# Patient Record
Sex: MALE | Race: BLACK OR AFRICAN AMERICAN | Employment: FULL TIME | URBAN - NONMETROPOLITAN AREA
[De-identification: names, ages, dates, MRNs, and addresses within clinical notes are randomized per-mention and may not be internally consistent; named-entity substitution may affect disease eponyms.]

---

## 2017-01-03 ENCOUNTER — HOSPITAL ENCOUNTER (EMERGENCY)
Age: 27
Discharge: HOME | End: 2017-01-03
Payer: MEDICAID

## 2017-01-03 VITALS
RESPIRATION RATE: 16 BRPM | HEART RATE: 63 BPM | DIASTOLIC BLOOD PRESSURE: 65 MMHG | OXYGEN SATURATION: 97 % | SYSTOLIC BLOOD PRESSURE: 118 MMHG | TEMPERATURE: 99 F

## 2017-01-03 DIAGNOSIS — J02.9 VIRAL PHARYNGITIS: Primary | ICD-10-CM

## 2017-01-03 LAB — S PYO AG THROAT QL: NEGATIVE

## 2017-01-03 PROCEDURE — 87880 STREP A ASSAY W/OPTIC: CPT

## 2017-01-03 PROCEDURE — 99282 EMERGENCY DEPT VISIT SF MDM: CPT | Performed by: NURSE PRACTITIONER

## 2017-01-03 PROCEDURE — 87081 CULTURE SCREEN ONLY: CPT

## 2017-01-03 PROCEDURE — 99282 EMERGENCY DEPT VISIT SF MDM: CPT

## 2017-01-03 PROCEDURE — 6370000000 HC RX 637 (ALT 250 FOR IP): Performed by: NURSE PRACTITIONER

## 2017-01-03 RX ORDER — IBUPROFEN 600 MG/1
600 TABLET ORAL EVERY 6 HOURS PRN
Qty: 30 TABLET | Refills: 0 | Status: SHIPPED | OUTPATIENT
Start: 2017-01-03 | End: 2017-02-11

## 2017-01-03 RX ORDER — PREDNISONE 20 MG/1
20 TABLET ORAL DAILY
Qty: 5 TABLET | Refills: 0 | Status: SHIPPED | OUTPATIENT
Start: 2017-01-03 | End: 2017-01-08

## 2017-01-03 RX ORDER — HYDROCODONE BITARTRATE AND ACETAMINOPHEN 7.5; 325 MG/1; MG/1
1 TABLET ORAL EVERY 6 HOURS PRN
Status: DISCONTINUED | OUTPATIENT
Start: 2017-01-03 | End: 2017-01-03 | Stop reason: HOSPADM

## 2017-01-03 RX ORDER — HYDROCODONE BITARTRATE AND ACETAMINOPHEN 5; 325 MG/1; MG/1
1 TABLET ORAL EVERY 8 HOURS PRN
Qty: 10 TABLET | Refills: 0 | Status: SHIPPED | OUTPATIENT
Start: 2017-01-03 | End: 2017-02-11

## 2017-01-03 RX ORDER — PREDNISONE 1 MG/1
20 TABLET ORAL DAILY
Status: DISCONTINUED | OUTPATIENT
Start: 2017-01-03 | End: 2017-01-03 | Stop reason: HOSPADM

## 2017-01-03 RX ADMIN — PREDNISONE 20 MG: 5 TABLET ORAL at 07:53

## 2017-01-03 RX ADMIN — HYDROCODONE BITARTRATE AND ACETAMINOPHEN 1 TABLET: 7.5; 325 TABLET ORAL at 07:58

## 2017-01-03 ASSESSMENT — PAIN SCALES - GENERAL: PAINLEVEL_OUTOF10: 10

## 2017-01-03 ASSESSMENT — ENCOUNTER SYMPTOMS
RESPIRATORY NEGATIVE: 1
SORE THROAT: 1

## 2017-01-05 LAB — S PYO THROAT QL CULT: NORMAL

## 2017-02-11 ENCOUNTER — HOSPITAL ENCOUNTER (EMERGENCY)
Age: 27
Discharge: HOME | End: 2017-02-11
Attending: EMERGENCY MEDICINE

## 2017-02-11 ENCOUNTER — APPOINTMENT (OUTPATIENT)
Dept: GENERAL RADIOLOGY | Age: 27
End: 2017-02-11

## 2017-02-11 VITALS
HEART RATE: 85 BPM | BODY MASS INDEX: 20.46 KG/M2 | SYSTOLIC BLOOD PRESSURE: 121 MMHG | HEIGHT: 68 IN | DIASTOLIC BLOOD PRESSURE: 60 MMHG | WEIGHT: 135 LBS | RESPIRATION RATE: 18 BRPM | TEMPERATURE: 98.7 F | OXYGEN SATURATION: 97 %

## 2017-02-11 DIAGNOSIS — J01.00 ACUTE NON-RECURRENT MAXILLARY SINUSITIS: Primary | ICD-10-CM

## 2017-02-11 LAB
RAPID INFLUENZA  B AGN: NEGATIVE
RAPID INFLUENZA A AGN: NEGATIVE
S PYO AG THROAT QL: NEGATIVE

## 2017-02-11 PROCEDURE — 71020 XR CHEST STANDARD TWO VW: CPT

## 2017-02-11 PROCEDURE — 99282 EMERGENCY DEPT VISIT SF MDM: CPT | Performed by: EMERGENCY MEDICINE

## 2017-02-11 PROCEDURE — 99283 EMERGENCY DEPT VISIT LOW MDM: CPT

## 2017-02-11 PROCEDURE — 87880 STREP A ASSAY W/OPTIC: CPT

## 2017-02-11 PROCEDURE — 6370000000 HC RX 637 (ALT 250 FOR IP): Performed by: EMERGENCY MEDICINE

## 2017-02-11 PROCEDURE — 87081 CULTURE SCREEN ONLY: CPT

## 2017-02-11 PROCEDURE — 87804 INFLUENZA ASSAY W/OPTIC: CPT

## 2017-02-11 RX ORDER — ACETAMINOPHEN 325 MG/1
650 TABLET ORAL ONCE
Status: COMPLETED | OUTPATIENT
Start: 2017-02-11 | End: 2017-02-11

## 2017-02-11 RX ORDER — MOMETASONE FUROATE 50 UG/1
2 SPRAY, METERED NASAL DAILY
Qty: 1 INHALER | Refills: 0 | Status: SHIPPED | OUTPATIENT
Start: 2017-02-11 | End: 2020-06-27

## 2017-02-11 RX ORDER — AMOXICILLIN AND CLAVULANATE POTASSIUM 875; 125 MG/1; MG/1
1 TABLET, FILM COATED ORAL 2 TIMES DAILY
Qty: 20 TABLET | Refills: 0 | Status: SHIPPED | OUTPATIENT
Start: 2017-02-11 | End: 2017-02-21

## 2017-02-11 RX ADMIN — ACETAMINOPHEN 650 MG: 325 TABLET, FILM COATED ORAL at 07:50

## 2017-02-11 ASSESSMENT — ENCOUNTER SYMPTOMS
SHORTNESS OF BREATH: 0
COUGH: 1
DIARRHEA: 0
NAUSEA: 0
SORE THROAT: 1
SINUS PRESSURE: 1
RHINORRHEA: 1
VOMITING: 0
BACK PAIN: 0
ABDOMINAL PAIN: 0

## 2017-02-11 ASSESSMENT — PAIN SCALES - GENERAL: PAINLEVEL_OUTOF10: 10

## 2017-02-13 LAB — S PYO THROAT QL CULT: NORMAL

## 2018-01-16 ENCOUNTER — OFFICE VISIT CONVERTED (OUTPATIENT)
Dept: ORTHOPEDIC SURGERY | Facility: CLINIC | Age: 28
End: 2018-01-16
Attending: ORTHOPAEDIC SURGERY

## 2020-06-27 ENCOUNTER — HOSPITAL ENCOUNTER (EMERGENCY)
Age: 30
Discharge: HOME | End: 2020-06-27
Attending: EMERGENCY MEDICINE

## 2020-06-27 VITALS
DIASTOLIC BLOOD PRESSURE: 78 MMHG | RESPIRATION RATE: 18 BRPM | BODY MASS INDEX: 22.6 KG/M2 | TEMPERATURE: 99 F | SYSTOLIC BLOOD PRESSURE: 115 MMHG | WEIGHT: 144 LBS | HEART RATE: 73 BPM | OXYGEN SATURATION: 95 % | HEIGHT: 67 IN

## 2020-06-27 PROCEDURE — 99283 EMERGENCY DEPT VISIT LOW MDM: CPT

## 2020-06-27 ASSESSMENT — ENCOUNTER SYMPTOMS
ABDOMINAL PAIN: 0
EYE PAIN: 0
VOMITING: 0
DIARRHEA: 0
SHORTNESS OF BREATH: 0

## 2021-05-16 VITALS — HEART RATE: 91 BPM | OXYGEN SATURATION: 99 % | BODY MASS INDEX: 21.97 KG/M2 | WEIGHT: 140 LBS | HEIGHT: 67 IN

## 2022-09-13 ENCOUNTER — TELEPHONE (OUTPATIENT)
Dept: GASTROENTEROLOGY | Age: 32
End: 2022-09-13

## 2022-09-13 NOTE — TELEPHONE ENCOUNTER
Jaylen Cage requests a call back please, he advised that paperwork is to be faxed to the office and he is wanting to schedule an appt as soon as possible for intestinal issues. I could not see a referral in the chart at the time the patient called. Thank you.

## 2022-09-14 NOTE — TELEPHONE ENCOUNTER
I got the referral over the fax machine just now. I will process it and call the patient to schedule.

## 2022-09-15 ENCOUNTER — OFFICE VISIT (OUTPATIENT)
Dept: GASTROENTEROLOGY | Age: 32
End: 2022-09-15
Payer: COMMERCIAL

## 2022-09-15 VITALS
WEIGHT: 125 LBS | OXYGEN SATURATION: 98 % | SYSTOLIC BLOOD PRESSURE: 120 MMHG | HEIGHT: 67 IN | HEART RATE: 92 BPM | BODY MASS INDEX: 19.62 KG/M2 | DIASTOLIC BLOOD PRESSURE: 70 MMHG

## 2022-09-15 DIAGNOSIS — G89.29 CHRONIC GENERALIZED ABDOMINAL PAIN: ICD-10-CM

## 2022-09-15 DIAGNOSIS — K52.9 CHRONIC DIARRHEA: Primary | ICD-10-CM

## 2022-09-15 DIAGNOSIS — R10.84 CHRONIC GENERALIZED ABDOMINAL PAIN: ICD-10-CM

## 2022-09-15 DIAGNOSIS — R93.3 ABNORMAL CT SCAN, COLON: ICD-10-CM

## 2022-09-15 PROCEDURE — 99203 OFFICE O/P NEW LOW 30 MIN: CPT | Performed by: NURSE PRACTITIONER

## 2022-09-15 PROCEDURE — G8427 DOCREV CUR MEDS BY ELIG CLIN: HCPCS | Performed by: NURSE PRACTITIONER

## 2022-09-15 PROCEDURE — G8420 CALC BMI NORM PARAMETERS: HCPCS | Performed by: NURSE PRACTITIONER

## 2022-09-15 PROCEDURE — 4004F PT TOBACCO SCREEN RCVD TLK: CPT | Performed by: NURSE PRACTITIONER

## 2022-09-15 RX ORDER — AMOXICILLIN AND CLAVULANATE POTASSIUM 875; 125 MG/1; MG/1
TABLET, FILM COATED ORAL
COMMUNITY
Start: 2022-09-12

## 2022-09-15 ASSESSMENT — ENCOUNTER SYMPTOMS
BLOOD IN STOOL: 0
ABDOMINAL DISTENTION: 0
ABDOMINAL PAIN: 1
VOICE CHANGE: 0
CONSTIPATION: 0
RECTAL PAIN: 0
TROUBLE SWALLOWING: 0
NAUSEA: 0
COUGH: 0
DIARRHEA: 1
SHORTNESS OF BREATH: 0
BACK PAIN: 0
ANAL BLEEDING: 0
SORE THROAT: 0
VOMITING: 0

## 2022-09-15 NOTE — PROGRESS NOTES
Subjective:      Kari Young is a34 y.o. male  Chief Complaint   Patient presents with    New Patient    Diarrhea       HPI  PCP: Xiomy Ivan MD  Referring Provider: Xiomy Ivan MD  New pt referral  From PCP  Pt reports the following GI complaints    Reports abd pain  From upper abdomen to pelvic region  Chronic for many years  Used to only occur after eating and when needing to have a BM  Over the past month the pain has worsened in intensity and also has occurred more frequently  Pain is a severe sharp stabbing cramping pain    Reports diarrhea  Chronic since childhood  Has 10+ diarrhea stools a day  Pt is color blind so not able to tell if there is blood in stool    Had CT abd/pelvis for evaluation of this 9/12/2022 at outside facility:  Mild wall thickening of ascending and proximal transverse colon consistent with acute colitis      Family HX:    Pt denies family hx of colon polyps, colon CA, inflammatory bowel dx, gastric CA and esophageal CA. History reviewed. No pertinent past medical history. Past Surgical History:   Procedure Laterality Date    TEAR DUCT SURGERY      TEAR DUCT SURGERY         Social History     Socioeconomic History    Marital status: Single     Spouse name: None    Number of children: None    Years of education: None    Highest education level: None   Tobacco Use    Smoking status: Every Day     Types: Cigarettes, Cigars    Smokeless tobacco: Never    Tobacco comments:     BLACK N MILDS   Vaping Use    Vaping Use: Never used   Substance and Sexual Activity    Alcohol use: Yes     Comment: occ    Drug use: Not Currently     Types: Marijuana Arianna Hurt)     Comment: STOPPED IN JULY 2022    Sexual activity: Not Currently       No Known Allergies    Current Outpatient Medications   Medication Sig Dispense Refill    amoxicillin-clavulanate (AUGMENTIN) 875-125 MG per tablet TAKE 1 TABLET BY MOUTH TWICE DAILY       No current facility-administered medications for this visit. Review of Systems   Constitutional:  Negative for appetite change, fatigue, fever and unexpected weight change. HENT:  Negative for sore throat, trouble swallowing and voice change. Respiratory:  Negative for cough and shortness of breath. Cardiovascular:  Negative for chest pain, palpitations and leg swelling. Gastrointestinal:  Positive for abdominal pain and diarrhea. Negative for abdominal distention, anal bleeding, blood in stool, constipation, nausea, rectal pain and vomiting. Genitourinary:  Negative for hematuria. Musculoskeletal:  Negative for arthralgias, back pain and neck pain. Neurological:  Negative for dizziness, weakness, light-headedness and headaches. Psychiatric/Behavioral:  Negative for dysphoric mood and sleep disturbance. The patient is not nervous/anxious. All other systems reviewed and are negative. Objective:     Physical Exam  Vitals and nursing note reviewed. Constitutional:       Appearance: He is well-developed. Comments: /70 (Site: Left Upper Arm)   Pulse 92   Ht 5' 7\" (1.702 m)   Wt 125 lb (56.7 kg)   SpO2 98%   BMI 19.58 kg/m²    Eyes:      General: No scleral icterus. Conjunctiva/sclera: Conjunctivae normal.      Pupils: Pupils are equal, round, and reactive to light. Neck:      Thyroid: No thyromegaly. Cardiovascular:      Rate and Rhythm: Normal rate and regular rhythm. Heart sounds: Normal heart sounds. No murmur heard. No friction rub. No gallop. Pulmonary:      Effort: Pulmonary effort is normal. No respiratory distress. Breath sounds: Normal breath sounds. Abdominal:      General: Bowel sounds are normal. There is no distension. Palpations: Abdomen is soft. Tenderness: There is abdominal tenderness (generalized abd with palpation). There is no rebound. Musculoskeletal:         General: No deformity. Normal range of motion. Cervical back: Normal range of motion and neck supple.    Skin:

## 2022-09-15 NOTE — PATIENT INSTRUCTIONS
You are going to have an Endoscopy and here are some basic instructions:    Nothing to eat or drink after midnight EXCEPT:  PLEASE TAKE MEDICATION(S) FOR HIGH BLOOD PRESSURE, SEIZURES, HEART, AND THYROID WITH A SIP OF WATER AT LEAST 2 HOURS PRIOR TO ARRIVAL TIME.   YOU MAY ALSO TAKE ANY INHALERS YOU ARE PRESCRIBED. You will not be able to drive for 24 hours after the procedure due to sedation. Bring a  with you the day of the procedure. No aspirin, ibuprofen, naproxen, fish oil or vitamin E for 5 days before procedure. Continue current medications. If you are on blood thinners, clearance from the prescribing physician will be obtained before your procedure is scheduled. Increased [email or web address] may be associated with discontinuation of your blood thinner and include, but not limited to, stroke, TIA, or cardiac event. If biopsies are taken during the procedure they will be sent to a pathologist for analysis. You will be notified by mail of the pathology results in 2-3 weeks. Your physician may also schedule a follow up appointment with the nurse practitioner to discuss pathology, symptoms or to check if you have had any problems related to your procedure. If you prefer not to return to the office after your procedure please discuss this with your physician on the day of your procedure. You are going to have a colonoscopy and here are some instructions: You will be given specific directions regarding restrictions to diet and bowel prep instructions including laxatives. Please read these instructions one week prior to your scheduled procedure to ensure that you are prepared. Follow prep instructions provided for bowel prep. Take all of the bowel prep as directed. If you are having problems with nausea, stop your prep for 30-45 min to allow the nausea to subside before resuming your prep.      Nothing to eat or drink after midnight the day of the procedure EXCEPT:  PLEASE TAKE MEDICATION(S) FOR HIGH BLOOD PRESSURE, THYROID, SEIZURES, AND HEART THE MORNING OF THE PROCEDURE WITH A SIP OF WATER  AT LEAST 2 HOURS PRIOR TO ARRIVAL TIME.   YOU MAY ALSO TAKE ANY INHALERS YOU ARE PRESCRIBED. You will not be able to drive for 24 hours after the procedure due to sedation. Bring a  with you the day of the procedure. No aspirin, ibuprofen, naproxen, fish oil or vitamin E for 5 days before procedure. If you are on blood thinners, clearance from the prescribing physician will be obtained before your procedure is scheduled. Increased [email or web address] may be associated with discontinuation of your blood thinner and include, but not limited to, stroke, TIA, or cardiac event. If polyps are removed during the procedure they will be sent to a pathologist for analysis. You will be notified by mail of the pathology results in 2-3 weeks. Your physician may also schedule a follow up appointment with the nurse practitioner to discuss pathology, symptoms or to check if you have had any problems related to your procedure. If you prefer not to return to the office after your procedure please discuss this with your physician on the day of your colonoscopy. Final recommendations are based on the pathologist report. Your diet before a colonoscopy bowel preparation is very important to ensure a successful colon exam. It is recommended to consider certain changes to your diet three to four days prior to the procedure. Remember that your bowels need to be empty for the exam.    What foods are good to eat? Cut down on heavy solid foods three to four days before the procedure and start introducing lighter meals to your diet. The following food suggestions are a good part of your diet before a colonoscopy bowel preparation.   Light meat that is easily digestible such as chicken (without the skin)   Potatoes without skin   Cheese   Eggs   A light meal of steamed white fish   Light clear

## 2022-09-16 ENCOUNTER — ANESTHESIA EVENT (OUTPATIENT)
Dept: OPERATING ROOM | Age: 32
End: 2022-09-16

## 2022-09-19 NOTE — CARE COORDINATION
Phoned pt to confirm [address] for 9/20/2022, pt states eh can not be here any sooner, that he is coming from [place].

## 2022-09-20 ENCOUNTER — ANESTHESIA (OUTPATIENT)
Dept: OPERATING ROOM | Age: 32
End: 2022-09-20

## 2022-09-20 ENCOUNTER — APPOINTMENT (OUTPATIENT)
Dept: OPERATING ROOM | Age: 32
End: 2022-09-20

## 2022-09-20 ENCOUNTER — HOSPITAL ENCOUNTER (OUTPATIENT)
Age: 32
Setting detail: OUTPATIENT SURGERY
Discharge: HOME | End: 2022-09-20
Attending: INTERNAL MEDICINE | Admitting: INTERNAL MEDICINE
Payer: COMMERCIAL

## 2022-09-20 ENCOUNTER — TELEPHONE (OUTPATIENT)
Dept: GASTROENTEROLOGY | Age: 32
End: 2022-09-20

## 2022-09-20 ENCOUNTER — HOSPITAL ENCOUNTER (OUTPATIENT)
Age: 32
Setting detail: SPECIMEN
Discharge: HOME | End: 2022-09-20
Payer: COMMERCIAL

## 2022-09-20 VITALS
DIASTOLIC BLOOD PRESSURE: 65 MMHG | HEIGHT: 67 IN | WEIGHT: 125 LBS | SYSTOLIC BLOOD PRESSURE: 103 MMHG | BODY MASS INDEX: 19.62 KG/M2 | OXYGEN SATURATION: 100 % | HEART RATE: 56 BPM | TEMPERATURE: 97.6 F | RESPIRATION RATE: 18 BRPM

## 2022-09-20 DIAGNOSIS — K52.9 CHRONIC DIARRHEA: ICD-10-CM

## 2022-09-20 DIAGNOSIS — A04.72 CLOSTRIDIUM DIFFICILE DIARRHEA: Primary | ICD-10-CM

## 2022-09-20 LAB
ADENOVIRUS F 40 41 PCR: NOT DETECTED
ASTROVIRUS PCR: NOT DETECTED
CAMPYLOBACTER PCR: NOT DETECTED
CLOSTRIDIUM DIFFICILE, PCR: DETECTED
CRYPTOSPORIDIUM PCR: NOT DETECTED
CYCLOSPORA CAYETANENSIS PCR: NOT DETECTED
E COLI ENTEROAGGREGATIVE PCR: NOT DETECTED
E COLI ENTEROPATHOGENIC PCR: NOT DETECTED
E COLI ENTEROTOXIGENIC PCR: NOT DETECTED
E COLI SHIGELLA/ENTEROINVASIVE PCR: DETECTED
ENTAMOEBA HISTOLYTICA PCR: NOT DETECTED
GIARDIA LAMBLIA PCR: NOT DETECTED
NOROVIRUS GI GII PCR: NOT DETECTED
PLESIOMONAS SHIGELLOIDES PCR: NOT DETECTED
ROTAVIRUS A PCR: NOT DETECTED
SALMONELLA PCR: NOT DETECTED
SAPOVIRUS PCR: NOT DETECTED
SHIGA-LIKE TOXIN-PRODUCING E. COLI (STEC) STX1/STX2: NOT DETECTED
VIBRIO CHOLERAE PCR: NOT DETECTED
VIBRIO PCR: NOT DETECTED
YERSINIA ENTEROCOLITICA PCR: NOT DETECTED

## 2022-09-20 PROCEDURE — 43239 EGD BIOPSY SINGLE/MULTIPLE: CPT | Performed by: INTERNAL MEDICINE

## 2022-09-20 PROCEDURE — 43239 EGD BIOPSY SINGLE/MULTIPLE: CPT

## 2022-09-20 PROCEDURE — 45380 COLONOSCOPY AND BIOPSY: CPT

## 2022-09-20 PROCEDURE — 88342 IMHCHEM/IMCYTCHM 1ST ANTB: CPT

## 2022-09-20 PROCEDURE — 88305 TISSUE EXAM BY PATHOLOGIST: CPT

## 2022-09-20 PROCEDURE — 45380 COLONOSCOPY AND BIOPSY: CPT | Performed by: INTERNAL MEDICINE

## 2022-09-20 RX ORDER — ONDANSETRON 2 MG/ML
4 INJECTION INTRAMUSCULAR; INTRAVENOUS
Status: CANCELLED | OUTPATIENT
Start: 2022-09-20 | End: 2022-09-20

## 2022-09-20 RX ORDER — PROPOFOL 10 MG/ML
INJECTION, EMULSION INTRAVENOUS PRN
Status: DISCONTINUED | OUTPATIENT
Start: 2022-09-20 | End: 2022-09-20 | Stop reason: SDUPTHER

## 2022-09-20 RX ORDER — SODIUM CHLORIDE 0.9 % (FLUSH) 0.9 %
5-40 SYRINGE (ML) INJECTION PRN
Status: CANCELLED | OUTPATIENT
Start: 2022-09-20

## 2022-09-20 RX ORDER — VANCOMYCIN HYDROCHLORIDE 125 MG/1
125 CAPSULE ORAL 4 TIMES DAILY
Qty: 56 CAPSULE | Refills: 0 | Status: SHIPPED | OUTPATIENT
Start: 2022-09-20 | End: 2022-10-04

## 2022-09-20 RX ORDER — SODIUM CHLORIDE 9 MG/ML
INJECTION, SOLUTION INTRAVENOUS PRN
Status: CANCELLED | OUTPATIENT
Start: 2022-09-20

## 2022-09-20 RX ORDER — SODIUM CHLORIDE 0.9 % (FLUSH) 0.9 %
5-40 SYRINGE (ML) INJECTION EVERY 12 HOURS SCHEDULED
Status: CANCELLED | OUTPATIENT
Start: 2022-09-20

## 2022-09-20 RX ORDER — SODIUM CHLORIDE 9 MG/ML
INJECTION, SOLUTION INTRAVENOUS CONTINUOUS
Status: DISCONTINUED | OUTPATIENT
Start: 2022-09-20 | End: 2022-09-20 | Stop reason: HOSPADM

## 2022-09-20 RX ORDER — DIPHENHYDRAMINE HYDROCHLORIDE 50 MG/ML
12.5 INJECTION INTRAMUSCULAR; INTRAVENOUS
Status: CANCELLED | OUTPATIENT
Start: 2022-09-20 | End: 2022-09-20

## 2022-09-20 RX ORDER — LIDOCAINE HYDROCHLORIDE 10 MG/ML
INJECTION, SOLUTION EPIDURAL; INFILTRATION; INTRACAUDAL; PERINEURAL PRN
Status: DISCONTINUED | OUTPATIENT
Start: 2022-09-20 | End: 2022-09-20 | Stop reason: SDUPTHER

## 2022-09-20 RX ADMIN — SODIUM CHLORIDE: 9 INJECTION, SOLUTION INTRAVENOUS at 13:02

## 2022-09-20 RX ADMIN — PROPOFOL 480 MG: 10 INJECTION, EMULSION INTRAVENOUS at 14:26

## 2022-09-20 RX ADMIN — LIDOCAINE HYDROCHLORIDE 30 MG: 10 INJECTION, SOLUTION EPIDURAL; INFILTRATION; INTRACAUDAL; PERINEURAL at 14:26

## 2022-09-20 ASSESSMENT — LIFESTYLE VARIABLES: SMOKING_STATUS: 1

## 2022-09-20 ASSESSMENT — PAIN - FUNCTIONAL ASSESSMENT: PAIN_FUNCTIONAL_ASSESSMENT: 0-10

## 2022-09-20 NOTE — DISCHARGE INSTRUCTIONS
1. Await path results, the patient will be contacted in 7-10 days with biopsy results. 2.  Continue PPI and anti-GERD measures  3. Avoid NSAIDs      4. Repeat colonoscopy: pending pathology -if biopsies do not reveal any microscopic colitis, at age 38 to begin colorectal cancer screening; sooner if his personal or family history as pertaining to colorectal cancer risk changes requiring an earlier exam or if the patient were to develop lower GI symptoms such as bleeding, abdominal pain, change in bowel habits or stool caliber or if the patient has anemia or unexplained weight loss in the future. - Resume previous meds and diet  - GI clinic f/u 4-6 weeks with Ms. Brownlee or Ms. Guillen   - Keep scheduled f/u appts with other MDs     - NO ASA/NSAIDs x 2 weeks    NSAIDS Non-steroidal Anti-Inflammatory  You have been directed by your physician to avoid any NSAID's; the following medications are a list of those to avoid. If you think that you are taking any NSAID's notify your physician. Over The Counter  Advil                      Motrin  Nuprin                   Ibuprofen  Midol                     Aleve  Naproxen              Orudis  Aspirin                   [place]      Prescriptions and Generics    Cataflam              Relafen  Voltaren               Clinoril  Indocin                 Naproxen  Arthrotec              Lodine  Daypro                 Nalfon  Toradol                Ansaid  Feldene               Meclofenamate  Fenoprofen          Ponstel  Mobic                   Celebrex  Vioxx     POST-OP ORDERS: ENDOSCOPY & COLONOSCOPY:    1. Rest today. 2. DO NOT eat or drink until wide awake; eat your usual diet today in moderate amount only. 3. DO NOT drive today. 4. Call physician if you have severe pain, vomiting, fever, rectal bleeding or black bowel movements. 5.  If a biopsy was taken or a polyp removed, you should expect to hear results in about 7-10 days.   If you have heard nothing from your physician by then, call the office for results. 6.  Discharge home when patient awake, vitals signs stable and tolerating liquids.

## 2022-09-20 NOTE — OP NOTE
Endoscopic Procedure Note    Patient: Wenceslao Jack : 1990  Med Rec#: [id number] Acc#: [id number]     Primary Care Provider Pcp No    Endoscopist: Stephen Jean MD, MD    Date of Procedure:  2022    Procedure:   1. EGD with cold biopsies    Indications: For both EGD and colonoscopy exams today:  1. Chronic diarrhea                  2. Chronic generalized abdominal pain             3. Abnormal CT scan, colon         Anesthesia:  Sedation was administered by anesthesia who monitored the patient during the procedure. Estimated Blood Loss: minimal    Procedure:   After reviewing the patient's chart and obtaining informed consent, the patient was placed in the left lateral decubitus position. A forward-viewing Olympus endoscope was lubricated and inserted through the mouth into the oropharynx. Under direct visualization, the upper esophagus was intubated. The scope was advanced to the level of the third portion of duodenum. Scope was slowly withdrawn with careful inspection of the mucosal surfaces. The scope was retroflexed for inspection of the gastric fundus and incisura. Findings and maneuvers are listed in impression below. The patient tolerated the procedure well. The scope was removed. There were no immediate complications. Findings/IMPRESSION:  Esophagus: normal and a normal EG junction at 42 cm. NO erosions or ulcers or nodules or strictures or webs or rings or mass lesions or extrinsic compression or diverticula noted. There is no obvious hiatal hernia present. Stomach:  abnormal: Patchy mucosal changes with areas of erythema and small 1 to 2 mm erosions in the distal body of the stomach and antrum suggestive of gastritis noted -  Gastric biopsies were taken from the antrum and body to rule out Helicobacter pylori infection. NO ulcers or masses or gastric outlet obstruction or retained food or fluid.  Rugae were normal and lumen distended well with insufflation. Retroflexed views otherwise revealed a normal GE junction, fundus and cardia as well. Duodenum: Normal including the major duodenal papilla. Random biopsies were taken to check for Celiac disease and other causes of villous atrophy. RECOMMENDATIONS:    1. Await path results, the patient will be contacted in 7-10 days with biopsy results. 2.  Continue PPI and anti-GERD measures  3. Avoid NSAIDs    The results were discussed with the patient and family. A copy of the images obtained were given to the patient.      (Please note that portions of this note were completed with a voice recognition program. Efforts were made to edit the dictations but occasionally words may be mis-transcribed.)     Nannette Anglin MD, MD  9/20/2022  2:18 PM

## 2022-09-20 NOTE — ANESTHESIA POSTPROCEDURE EVALUATION
Department of Anesthesiology  Postprocedure Note    Patient: Angela Lemus  MRN: [id number]  YOB: 1990  Date of evaluation: 9/20/2022      Procedure Summary     Date: 09/20/22 Room / Location: [facility] ENDO 02 / [address]    Anesthesia Start: 1766 Anesthesia Stop: 6378    Procedures:       EGD BIOPSY (Esophagus)      COLONOSCOPY WITH BIOPSY (Abdomen) Diagnosis:       Chronic abdominal pain      Chronic diarrhea      (Chronic abdominal pain [R10.9, G89.29])      (Chronic diarrhea [K52.9])    Surgeons: Lyubov Chang MD Responsible Provider: ABHI Rajan CRNA    Anesthesia Type: General ASA Status: 2          Anesthesia Type: General    Darrion Phase I:      Darrion Phase II:        Anesthesia Post Evaluation    Patient location during evaluation: bedside  Patient participation: complete - patient participated  Level of consciousness: awake  Pain score: 0  Airway patency: patent  Nausea & Vomiting: no nausea and no vomiting  Complications: no  Cardiovascular status: hemodynamically stable  Respiratory status: acceptable, room air and spontaneous ventilation  Hydration status: euvolemic

## 2022-09-20 NOTE — TELEPHONE ENCOUNTER
Please let Baljit Plunkett know I have reviewed results of stool testing. He has c diff as well as Enterovasive E coli Shigella. Very contagious bacterial infection causing diarrhea  Teach him all the precautions with C diff please. Im sending Vancomycin in for the cdiff  Antibiotic treatment not indicated for the tx of Enterovasive E Coli shigella toxin. After completing the vancomycin use Align probiotic  for  1 month.  F/u in 8 weeks to discuss symptoms

## 2022-09-20 NOTE — ANESTHESIA PRE PROCEDURE
Department of Anesthesiology  Preprocedure Note       Name:  Forrest Crain   Age:  27 y.o.  :  1990                                          MRN:  [id number]         Date:  2022      Surgeon: Aparna Spear):  Bonifacio Avalos MD    Procedure: Procedure(s):  EGD BIOPSY  COLORECTAL CANCER SCREENING, NOT HIGH RISK    Medications prior to admission:   Prior to Admission medications    Medication Sig Start Date End Date Taking? Authorizing Provider   amoxicillin-clavulanate (AUGMENTIN) 875-125 MG per tablet TAKE 1 TABLET BY MOUTH TWICE DAILY 22   Historical Provider, MD       Current medications:    Current Facility-Administered Medications   Medication Dose Route Frequency Provider Last Rate Last Admin    0.9 % sodium chloride infusion   IntraVENous Continuous Bonifacio Avalos  mL/hr at 22 1302 New Bag at 22 1302       Allergies:  No Known Allergies    Problem List:    Patient Active Problem List   Diagnosis Code    Chronic diarrhea K52.9    Chronic generalized abdominal pain R10.84, G89.29    Abnormal CT scan, colon R93.3       Past Medical History:  History reviewed. No pertinent past medical history.     Past Surgical History:        Procedure Laterality Date    TEAR DUCT SURGERY      TEAR DUCT SURGERY         Social History:    Social History     Tobacco Use    Smoking status: Every Day     Types: Cigars    Smokeless tobacco: Never    Tobacco comments:     BLACK N MILDS   Substance Use Topics    Alcohol use: Yes     Comment: occ                                Ready to quit: Not Answered  Counseling given: Not Answered  Tobacco comments: ISIDRO LYNCH      Vital Signs (Current):   Vitals:    22 1242   BP: 115/69   Pulse: 75   Resp: 18   Temp: 98.1 °F (36.7 °C)   TempSrc: Temporal   SpO2: 100%   Weight: 125 lb (56.7 kg)   Height: 5' 7\" (1.702 m)                                              BP Readings from Last 3 Encounters:   22 115/69   09/15/22 120/70   06/27/20 115/78       NPO Status: Time of last liquid consumption: 1130                        Time of last solid consumption: 2000                        Date of last liquid consumption: 09/20/22                        Date of last solid food consumption: 09/18/22    BMI:   Wt Readings from Last 3 Encounters:   09/20/22 125 lb (56.7 kg)   09/15/22 125 lb (56.7 kg)   06/27/20 144 lb (65.3 kg)     Body mass index is 19.58 kg/m². CBC: No results found for: WBC, RBC, HGB, HCT, MCV, RDW, PLT    CMP: No results found for: NA, K, CL, CO2, BUN, CREATININE, GFRAA, AGRATIO, LABGLOM, GLUCOSE, GLU, PROT, CALCIUM, BILITOT, ALKPHOS, AST, ALT    POC Tests: No results for input(s): POCGLU, POCNA, POCK, POCCL, POCBUN, POCHEMO, POCHCT in the last 72 hours. Coags: No results found for: PROTIME, INR, APTT    HCG (If Applicable): No results found for: PREGTESTUR, PREGSERUM, HCG, HCGQUANT     ABGs: No results found for: PHART, PO2ART, RSE6VPO, PFG3TDX, BEART, O0HZRPPG     Type & Screen (If Applicable):  No results found for: LABABO, LABRH    Drug/Infectious Status (If Applicable):  No results found for: HIV, HEPCAB    COVID-19 Screening (If Applicable): No results found for: COVID19        Anesthesia Evaluation  Patient summary reviewed and Nursing notes reviewed  Airway: Mallampati: I  TM distance: >3 FB   Neck ROM: full  Mouth opening: > = 3 FB   Dental: normal exam         Pulmonary:Negative Pulmonary ROS and normal exam  breath sounds clear to auscultation  (+) current smoker          Patient did not smoke on day of surgery.                  Cardiovascular:Negative CV ROS  Exercise tolerance: good (>4 METS),           Rhythm: regular  Rate: normal           Beta Blocker:  Not on Beta Blocker         Neuro/Psych:   Negative Neuro/Psych ROS              GI/Hepatic/Renal: Neg GI/Hepatic/Renal ROS            Endo/Other: Negative Endo/Other ROS                    Abdominal:             Vascular: negative vascular ROS.         Other Findings:           Anesthesia Plan      general and TIVA     ASA 2       Induction: intravenous. Anesthetic plan and risks discussed with patient.                         ABHI Lindsay - CRNA   9/20/2022

## 2022-09-20 NOTE — OP NOTE
Patient: Yahaira Corbett : 1990  Med Rec#: [id number] Acc#: [id number]   Primary Care Provider Pcp No    Date of Procedure:  2022    Endoscopist: Arti Alanis MD, MD    Referring Provider: Pcp Jeanne,     Operation Performed: Colonoscopy up to the terminal ileum with cold biopsies    Indications: For both EGD and colonoscopy exams today:  1. Chronic diarrhea                  2. Chronic generalized abdominal pain             3. Abnormal CT scan, colon         Anesthesia:  Sedation was administered by anesthesia who monitored the patient during the procedure. I met with Yahaira Corbett prior to procedure. We discussed the procedure itself, and I have discussed the risks of endoscopy (including-- but not limited to-- pain, discomfort, bleeding potentially requiring second endoscopic procedure and/or blood transfusion, organ perforation requiring operative repair, damage to organs near the colon, infection, aspiration, cardiopulmonary/allergic reaction), benefits, indications to endoscopy. Additionally, we discussed options other than colonoscopy. The patient expressed understanding. All questions answered. The patient decided to proceed with the procedure. Signed informed consent was placed on the chart. Blood Loss: minimal    Withdrawal time: More than 6 minutes  Bowel Prep: Fair to poor with thick solid and semisolid stool scattered in segments throughout the colon obscuring the underlying mucosa. Small lesions including polyps may have been missed. Complications: no immediate complications    DESCRIPTION OF PROCEDURE:     A time out was performed. After written informed consent was obtained, the patient was placed in the left lateral position. The perianal area was inspected, and a digital rectal exam was performed. A rectal exam was performed: normal tone, no palpable lesions.  At this point, a forward viewing Olympus colonoscope was inserted into the anus and carefully advanced to the terminal ileum. The cecum was identified by the ileocecal valve and the appendiceal orifice. The colonoscope was then slowly withdrawn with careful inspection of the mucosa in a linear and circumferential fashion. The scope was retroflexed in the rectum. Suction was utilized during the procedure to remove as much air as possible from the bowel. The colonoscope was removed from the patient, and the procedure was terminated. Findings are listed below. Findings:     NO large polyps or masses or strictures or colitis or ileitis or proctitis or overt IBD. Random colon biopsies were taken to check for microscopic colitis  Suboptimal exam due to prep quality as described above. Internal hemorrhoids-Grade 1 without any bleeding stigmata  Where it was clearly visible, the mucosa appeared normal throughout the entire examined colon  Retroflexion in the rectum was otherwise normal and revealed no further abnormalities      Recommendations:  1. Repeat colonoscopy: pending pathology -if biopsies do not reveal any microscopic colitis, at age 38 to begin colorectal cancer screening; sooner if his personal or family history as pertaining to colorectal cancer risk changes requiring an earlier exam or if the patient were to develop lower GI symptoms such as bleeding, abdominal pain, change in bowel habits or stool caliber or if the patient has anemia or unexplained weight loss in the future. 2. Await biopsy results-you will receive a letter with your results within 7-10 days    - Resume previous meds and diet  - GI clinic f/u 4-6 weeks with Ms. Brownlee or Ms. Guillen   - Keep scheduled f/u appts with other MDs     - NO ASA/NSAIDs x 2 weeks     Findings and recommendations were discussed w/ the patient. A copy of the images was provided.     (Please note that portions of this note were completed with a voice recognition program. Efforts were made to edit the dictations but occasionally words may be

## 2022-09-20 NOTE — ADDENDUM NOTE
Addendum  created 09/20/22 1453 by Barbra Koyanagi, APRN - CRNA    Intraprocedure Meds edited, Orders acknowledged in Narrator

## 2022-09-20 NOTE — H&P
Patient Name: Bernadette Coley  : 1990  MRN: [id number]  DATE: 22    Allergies: No Known Allergies     ENDOSCOPY  History and Physical    Procedure:    [x] Diagnostic Colonoscopy       [] Screening Colonoscopy  [x] EGD      [] ERCP      [] EUS       [] Other    [x] Previous office notes/History and Physical reviewed from the patients chart. Please see EMR for further details of HPI. I have examined the patient's status immediately prior to the procedure and:      Indications/HPI: For both EGD and colonoscopy exams today:  1. Chronic diarrhea                  2. Chronic generalized abdominal pain             3. Abnormal CT scan, colon         []Abdominal Pain   []Cancer- GI/Lung     []Fhx of colon CA/polyps  []History of Polyps  []Barretts            []Melena  []Abnormal Imaging              []Dysphagia              []Persistent Pneumonia   []Anemia                            []Food Impaction        []History of Polyps  [] GI Bleed             []Pulmonary nodule/Mass   []Change in bowel habits []Heartburn/Reflux  []Rectal Bleed (BRBPR)  []Chest Pain - Non Cardiac []Heme (+) Stool []Ulcers  []Constipation  []Hemoptysis  []Varices  []Diarrhea  []Hypoxemia    []Nausea/Vomiting   []Screening   []Crohns/Colitis  []Other:     Anesthesia:   [x] MAC [] Moderate Sedation   [] General   [] None     ROS: 12 pt Review of Symptoms was negative unless mentioned above    Medications:   Prior to Admission medications    Medication Sig Start Date End Date Taking? Authorizing Provider   amoxicillin-clavulanate (AUGMENTIN) 875-125 MG per tablet TAKE 1 TABLET BY MOUTH TWICE DAILY 22   Historical Provider, MD       Past Medical History:  History reviewed. No pertinent past medical history.     Past Surgical History:  Past Surgical History:   Procedure Laterality Date    TEAR DUCT SURGERY      TEAR DUCT SURGERY         Social History:  Social History     Tobacco Use    Smoking status: Every Day     Types: Cigars Smokeless tobacco: Never    Tobacco comments:     BLACK N MILDS   Vaping Use    Vaping Use: Never used   Substance Use Topics    Alcohol use: Yes     Comment: occ    Drug use: Not Currently     Types: Marijuana Arianna Mustard)     Comment: STOPPED IN JULY 2022       Vital Signs:   Vitals:    09/20/22 1242   BP: 115/69   Pulse: 75   Resp: 18   Temp: 98.1 °F (36.7 °C)   SpO2: 100%        Physical Exam:  Cardiac:  [x]WNL  []Comments:  Pulmonary:  [x]WNL   []Comments:  Neuro/Mental Status:  [x]WNL  []Comments:  Abdominal:  [x]WNL    []Comments:  Other:   []WNL  []Comments:    Informed Consent:  The risks and benefits of the procedure have been discussed with either the patient or if they cannot consent, their representative. Assessment:  Patient examined and appropriate for planned sedation and procedure. Plan:  Proceed with planned sedation and procedure as above.          Yu Porter MD

## 2022-09-22 LAB
CAMPYLOBACTER ANTIGEN: NORMAL
CULTURE, STOOL: NORMAL
E COLI SHIGA TOXIN ASSAY: NORMAL

## 2022-09-27 ENCOUNTER — TELEPHONE (OUTPATIENT)
Dept: GASTROENTEROLOGY | Age: 32
End: 2022-09-27

## 2022-09-27 NOTE — TELEPHONE ENCOUNTER
Thanks Aleksandr Oconnor, this patient has been notified and said he will try Tylenol and if that does not help he will be calling his PCP.  Jerson ortiz

## 2022-09-27 NOTE — TELEPHONE ENCOUNTER
9-27-22  @ 11:09 am     Patient called today from [phone] stating he is currently on Vancomycin 125 mg Qid x 14 days, he is currently on day 6 for C-Diff and E-Coli. Patient said he has had terrible headaches since he has been on the Vancomycin and nothing he has tried is taking this headache away. Patient asking if this is normal and what can he use to help get rid of this HA, said he has been very fatigued as well. Patient asking for a return call with recommendations. I will forward to [facility]rn and [facility]. [facility]         Note         Waldo Ferreira, FERNANDO Saez 6 days ago     TRENT Castellon, here is what I went over with you on the phone. Please let Castillo Neither know I have reviewed results of stool testing. He has c diff as well as Enterovasive E coli Shigella. Very contagious bacterial infection causing diarrhea  Teach him all the precautions with C diff please. Im sending Vancomycin in for the cdiff  Antibiotic treatment not indicated for the tx of Enterovasive E Coli shigella toxin. After completing the vancomycin use Align probiotic  for  1 month. F/u in 8 weeks to discuss symptoms     You are to wipe down w/sanitize wipes the bathroom ever time you use it. Stay away from young child and elderly adults with health problems. No sharing food or drinks with anyone. Reach out to me if you have any other questions or concerns.   Thanks, [employer]

## 2022-09-27 NOTE — TELEPHONE ENCOUNTER
Please let Abimbola Haq know I have reviewed the side effect profile of vancomycin and headache isnt listed as an adverse reaction.   He can use tylenol for headache however id advise following up with his PCP for this. thanks

## 2022-09-28 ENCOUNTER — TELEPHONE (OUTPATIENT)
Dept: GASTROENTEROLOGY | Age: 32
End: 2022-09-28

## 2022-09-28 NOTE — TELEPHONE ENCOUNTER
Serg Goetz, [facility] I responded to this message yesterday when it was sent to me. Thanks!

## 2022-10-08 ENCOUNTER — OFFICE VISIT (OUTPATIENT)
Age: 32
End: 2022-10-08
Payer: COMMERCIAL

## 2022-10-08 VITALS
DIASTOLIC BLOOD PRESSURE: 72 MMHG | HEIGHT: 67 IN | RESPIRATION RATE: 18 BRPM | BODY MASS INDEX: 19.62 KG/M2 | HEART RATE: 74 BPM | WEIGHT: 125 LBS | SYSTOLIC BLOOD PRESSURE: 100 MMHG | OXYGEN SATURATION: 100 %

## 2022-10-08 DIAGNOSIS — R21 RASH: Primary | ICD-10-CM

## 2022-10-08 PROCEDURE — G8427 DOCREV CUR MEDS BY ELIG CLIN: HCPCS | Performed by: NURSE PRACTITIONER

## 2022-10-08 PROCEDURE — 4004F PT TOBACCO SCREEN RCVD TLK: CPT | Performed by: NURSE PRACTITIONER

## 2022-10-08 PROCEDURE — G8420 CALC BMI NORM PARAMETERS: HCPCS | Performed by: NURSE PRACTITIONER

## 2022-10-08 PROCEDURE — G8484 FLU IMMUNIZE NO ADMIN: HCPCS | Performed by: NURSE PRACTITIONER

## 2022-10-08 PROCEDURE — 99213 OFFICE O/P EST LOW 20 MIN: CPT | Performed by: NURSE PRACTITIONER

## 2022-10-08 ASSESSMENT — ENCOUNTER SYMPTOMS
SHORTNESS OF BREATH: 0
SORE THROAT: 0

## 2022-10-08 NOTE — PATIENT INSTRUCTIONS
May take zyrtec every 6 hours as needed for itch  Follow up if symptoms worsen or fail to improve  Rash should improve with discontinuing of medication.

## 2022-10-08 NOTE — PROGRESS NOTES
[address]  [address] [facility] [address] [id number]  Dept: [phone]  Dept Fax: [phone]  Loc: [phone]     Deya Vargas is a 27 y.o. male who presents today for his medical conditions/complaintsas noted below. Deya Vargas is c/o of Rash (Started Last week, Pt just finished AUGMENTIN 875-125 MG. ///)        HPI:     Rash  The current episode started in the past 7 days. The problem has been gradually improving since onset. The rash is diffuse. The rash is characterized by itchiness. He was exposed to a new medication (was taking amoxicillin but yesterday was last dose of 10 days). Pertinent negatives include no anorexia, facial edema, fatigue, fever, shortness of breath or sore throat. Past treatments include antihistamine. The treatment provided mild relief. No past medical history on file. Past Surgical History:   Procedure Laterality Date    COLONOSCOPY N/A 09/20/2022    Dr Christine Rayo, (-)micro colitis, int hem Gr1, 13 year recall    TEAR DUCT SURGERY      TEAR DUCT SURGERY      UPPER GASTROINTESTINAL ENDOSCOPY N/A 09/20/2022    Dr Chuck Sanchez gastritis, (-)Sprue, (-)Hpylori,       Family History   Problem Relation Age of Onset    Colon Cancer Neg Hx     Colon Polyps Neg Hx        Social History     Tobacco Use    Smoking status: Every Day     Types: Cigars    Smokeless tobacco: Never    Tobacco comments:     BLACK N MILDS   Substance Use Topics    Alcohol use: Yes     Comment: occ      Current Outpatient Medications   Medication Sig Dispense Refill    amoxicillin-clavulanate (AUGMENTIN) 875-125 MG per tablet TAKE 1 TABLET BY MOUTH TWICE DAILY (Patient not taking: No sig reported)       No current facility-administered medications for this visit.      No Known Allergies    Health Maintenance   Topic Date Due    COVID-19 Vaccine (1) Never done    Varicella vaccine (1 of 2 - 2-dose childhood series) Never done    Pneumococcal 0-64 years Vaccine (1 - PCV) Never done    Depression Screen  Never done    DTaP/Tdap/Td vaccine (2 - Tdap) 06/02/2004    HIV screen  Never done    Hepatitis C screen  Never done    Flu vaccine (1) Never done    Hepatitis A vaccine  Aged Out    Hib vaccine  Aged Out    Meningococcal (ACWY) vaccine  Aged Out       Subjective:     Review of Systems   Constitutional:  Negative for fatigue and fever. HENT:  Negative for sore throat. Respiratory:  Negative for shortness of breath. Gastrointestinal:  Negative for anorexia. Skin:  Positive for rash. Objective:     Physical Exam  Vitals and nursing note reviewed. Constitutional:       Appearance: He is well-developed. HENT:      Head: Normocephalic and atraumatic. Cardiovascular:      Rate and Rhythm: Normal rate. Heart sounds: Normal heart sounds. Pulmonary:      Effort: Pulmonary effort is normal.      Breath sounds: Normal breath sounds. Skin:     General: Skin is warm. Capillary Refill: Capillary refill takes less than 2 seconds. Findings: Erythema and rash present. Rash is macular. Comments: Diffuse macular erythematous lesions arms, legs, trunk. No warmth, drainage, streaks noted. Neurological:      Mental Status: He is alert and oriented to person, place, and time. Psychiatric:         Behavior: Behavior normal.         Thought Content: Thought content normal.         Judgment: Judgment normal.     /72   Pulse 74   Resp 18   Ht 5' 7\" (1.702 m)   Wt 125 lb (56.7 kg)   SpO2 100%   BMI 19.58 kg/m²     Assessment:          Diagnosis Orders   1. Rash            Plan:    No orders of the defined types were placed in this encounter. No follow-ups on file. No orders of the defined types were placed in this encounter. No orders of the defined types were placed in this encounter. Patient given educationalmaterials - see patient instructions.   Discussed use, benefit, and side effectsof prescribed medications. All patient questions answered. Pt voiced understanding. Reviewed health maintenance. Instructed to continue current medications, diet andexercise. Patient agreed with treatment plan. Follow up as directed.      Patient Instructions   May take zyrtec every 6 hours as needed for itch  Follow up if symptoms worsen or fail to improve      Electronically signed by ABHI Campos CNP on 10/8/2022 at 1:18 PM

## 2022-10-10 ENCOUNTER — TELEPHONE (OUTPATIENT)
Dept: GASTROENTEROLOGY | Age: 32
End: 2022-10-10

## 2022-10-10 NOTE — TELEPHONE ENCOUNTER
vancomycin (VANCOCIN) 125 MG capsule    Date: 2022 Department: [facility] Ordering/Authorizing: ABHI Magdaleno     Outpatient Medication Detail     Disp Refills Start End    vancomycin (VANCOCIN) 125 MG capsule 56 capsule 0 2022 10/4/2022    Sig - Route: Take 1 capsule by mouth 4 times daily for 14 days - Oral    Sent to pharmacy as: Vancomycin HCl 125 MG Oral Capsule (VANCOCIN)    E-Prescribing Status: Receipt confirmed by pharmacy (2022  3:03 PM CDT)        Last visit [facility] aprn 9-15-22. FU 10-25-22. No PCP. Hx of C-Diff. Patient called today from [phone] said he took the above Rx and noticed towards the end of the Rx he started breaking out in small upraised bumps all over his body and said it itches really bad, does not report any SOB, said he went to  and all there recommended there was Benadryl and Zyrtec which he said did not help him at all. Patient wants [facility] opinion on this, stating he wants these bumps gone and the itching to stop and asking for [facility] aprn to prescribe something to help. Patient is trying to get established with a PCP at present.  Jerson ortiz

## 2022-10-11 NOTE — TELEPHONE ENCOUNTER
Did he complete the RX of vancomycin? If the rash is from the vancomycin it will resolve on its own within a few days of stopping the medication, when was his last dose of it? Please call in Medrol dose pack to his pharmacy of choice, take as directed, dispense #1, no refills.   Id also recommend Benadryl 25mg BID-TID and zyrtec 10mg daily til rash is gone

## 2022-10-17 ENCOUNTER — TELEPHONE (OUTPATIENT)
Dept: GASTROENTEROLOGY | Age: 32
End: 2022-10-17

## 2022-10-17 NOTE — TELEPHONE ENCOUNTER
Patient called to see if he can get in sooner due to him leaving town for a job. Please return call to advise.

## 2023-01-10 ENCOUNTER — OFFICE VISIT (OUTPATIENT)
Age: 33
End: 2023-01-10
Payer: COMMERCIAL

## 2023-01-10 VITALS
TEMPERATURE: 99 F | DIASTOLIC BLOOD PRESSURE: 64 MMHG | WEIGHT: 137.4 LBS | HEIGHT: 66 IN | SYSTOLIC BLOOD PRESSURE: 118 MMHG | BODY MASS INDEX: 22.08 KG/M2 | RESPIRATION RATE: 20 BRPM | HEART RATE: 99 BPM | OXYGEN SATURATION: 97 %

## 2023-01-10 DIAGNOSIS — J06.9 VIRAL URI WITH COUGH: ICD-10-CM

## 2023-01-10 DIAGNOSIS — H66.003 NON-RECURRENT ACUTE SUPPURATIVE OTITIS MEDIA OF BOTH EARS WITHOUT SPONTANEOUS RUPTURE OF TYMPANIC MEMBRANES: Primary | ICD-10-CM

## 2023-01-10 DIAGNOSIS — J39.8 CONGESTION OF UPPER RESPIRATORY TRACT: ICD-10-CM

## 2023-01-10 DIAGNOSIS — Z11.52 ENCOUNTER FOR SCREENING FOR COVID-19: ICD-10-CM

## 2023-01-10 LAB
INFLUENZA A ANTIBODY: NORMAL
INFLUENZA B ANTIBODY: NORMAL
SARS-COV-2, PCR: NOT DETECTED

## 2023-01-10 PROCEDURE — 99213 OFFICE O/P EST LOW 20 MIN: CPT

## 2023-01-10 PROCEDURE — 87804 INFLUENZA ASSAY W/OPTIC: CPT

## 2023-01-10 RX ORDER — AMOXICILLIN AND CLAVULANATE POTASSIUM 875; 125 MG/1; MG/1
1 TABLET, FILM COATED ORAL 2 TIMES DAILY
Qty: 20 TABLET | Refills: 0 | Status: SHIPPED | OUTPATIENT
Start: 2023-01-10 | End: 2023-01-20

## 2023-01-10 ASSESSMENT — ENCOUNTER SYMPTOMS
COUGH: 1
SORE THROAT: 1

## 2023-01-10 NOTE — PROGRESS NOTES
[address]  [address] [facility] [address] [id number]  Dept: [phone]  Dept Fax: [phone]  Loc: [phone]    Margarito Durán is a 34 y.o. male who presents today for his medical conditions/complaints as noted below. Margarito Durán is c/o of Fever, Chills, Generalized Body Aches, Headache, Fatigue, and Congestion        HPI:     HPI  Margarito Durán presents with complaints of fever, body aches, chills, fatigue, headache and congestion. Reports sore throat, cough and congestion but the body aches and chills are worst symptoms. Symptoms began last Thursday. OTC treatment includes ibuprofen, severe sinus tylenol, dayquil cold and flu and benadryl. Denies recent steroids but had antibiotics in Sept. Denies recent covid19 infection. Vaccinated against [id number]. No past medical history on file. Past Surgical History:   Procedure Laterality Date    COLONOSCOPY N/A 09/20/2022    Dr Fermin Ellington, (-)micro colitis, int hem Gr1, 13 year recall    TEAR DUCT SURGERY      TEAR DUCT SURGERY      UPPER GASTROINTESTINAL ENDOSCOPY N/A 09/20/2022    Dr Elvira Pina gastritis, (-)Sprue, (-)Hpylori,       Family History   Problem Relation Age of Onset    Colon Cancer Neg Hx     Colon Polyps Neg Hx        Social History     Tobacco Use    Smoking status: Every Day     Types: Cigars    Smokeless tobacco: Never    Tobacco comments:     BLACK N MILDS   Substance Use Topics    Alcohol use: Yes     Comment: occ      Current Outpatient Medications   Medication Sig Dispense Refill    amoxicillin-clavulanate (AUGMENTIN) 875-125 MG per tablet Take 1 tablet by mouth 2 times daily for 10 days 20 tablet 0     No current facility-administered medications for this visit.      No Known Allergies    Health Maintenance   Topic Date Due    COVID-19 Vaccine (1) Never done    Varicella vaccine (1 of 2 - 2-dose childhood series) Never done    Pneumococcal 0-64 years Vaccine (1 - PCV) Never done    Depression Screen  Never done    DTaP/Tdap/Td vaccine (2 - Tdap) 06/02/2004    HIV screen  Never done    Hepatitis C screen  Never done    Flu vaccine (1) Never done    Hepatitis A vaccine  Aged Out    Hib vaccine  Aged Out    Meningococcal (ACWY) vaccine  Aged Out       Subjective:     Review of Systems   Constitutional:  Positive for chills, fatigue and fever. HENT:  Positive for congestion, ear pain and sore throat. Respiratory:  Positive for cough. Musculoskeletal:  Positive for myalgias. Neurological:  Positive for headaches.     :Objective      Physical Exam  Constitutional:       General: He is not in acute distress. Appearance: Normal appearance. He is ill-appearing. He is not toxic-appearing. HENT:      Head: Normocephalic and atraumatic. Right Ear: Ear canal and external ear normal. Tympanic membrane is erythematous (cloudy). Left Ear: Ear canal and external ear normal. Tympanic membrane is erythematous and bulging. Nose: Congestion present. Mouth/Throat:      Mouth: Mucous membranes are moist.      Pharynx: Oropharynx is clear. Posterior oropharyngeal erythema present. No oropharyngeal exudate. Eyes:      General:         Right eye: No discharge. Left eye: No discharge. Conjunctiva/sclera: Conjunctivae normal.   Cardiovascular:      Rate and Rhythm: Normal rate and regular rhythm. Pulmonary:      Effort: Pulmonary effort is normal. No respiratory distress. Abdominal:      General: Abdomen is flat. Palpations: Abdomen is soft. Musculoskeletal:         General: Normal range of motion. Cervical back: Normal range of motion. Lymphadenopathy:      Cervical: No cervical adenopathy. Skin:     General: Skin is warm and dry. Capillary Refill: Capillary refill takes less than 2 seconds. Findings: No rash. Neurological:      General: No focal deficit present. Mental Status: He is alert. Psychiatric:         Mood and Affect: Mood normal.     /64   Pulse 99   Temp 99 °F (37.2 °C)   Resp 20   Ht 5' 6\" (1.676 m)   Wt 137 lb 6.4 oz (62.3 kg)   SpO2 97%   BMI 22.18 kg/m²     :Assessment       Diagnosis Orders   1. Non-recurrent acute suppurative otitis media of both ears without spontaneous rupture of tympanic membranes  amoxicillin-clavulanate (AUGMENTIN) 875-125 MG per tablet      2. Viral URI with cough        3. Congestion of upper respiratory tract  POCT Influenza A/B      4. Encounter for screening for COVID-19  COVID-19          :Plan   Flu negative in office, likely viral URI that has now developed into bilateral AOM. Augmentin to cover. History of C Dif- discussed importance of daily probiotics to maintain gut health. R/O [id number] as source of symptoms. Encouraged supportive care at home. Return precautions and home care education completed. Patient verbalized understanding. Orders Placed This Encounter   Procedures    COVID-19     Scheduling Instructions:      1) Due to current limited availability of the COVID-19 test, tests will be prioritized based on responses to questions above. Testing may be delayed due to volume. 2) Print and instruct patient to adhere to CDC home isolation program. (Link Above)              3) Set up or refer patient for a monitoring program.              4) Have patient sign up for and leverage [email or web address] (if not previously done). Order Specific Question:   Is this test for diagnosis or screening? Answer:   Diagnosis of ill patient     Order Specific Question:   Symptomatic for COVID-19 as defined by CDC? Answer:   Yes     Order Specific Question:   Date of Symptom Onset     Answer:   1/5/2023     Order Specific Question:   Hospitalized for COVID-19? Answer:   No     Order Specific Question:   Admitted to ICU for COVID-19? Answer:   No     Order Specific Question:   Employed in healthcare setting?      Answer:   No     Order Specific Question:   Resident in a congregate (group) care setting? Answer:   No     Order Specific Question:   Previously tested for COVID-19? Answer:   Unknown     Order Specific Question:   Pregnant: Answer:   No    POCT Influenza A/B     No results found for this visit on 01/10/23. No follow-ups on file. Orders Placed This Encounter   Medications    amoxicillin-clavulanate (AUGMENTIN) 875-125 MG per tablet     Sig: Take 1 tablet by mouth 2 times daily for 10 days     Dispense:  20 tablet     Refill:  0       Patient given educational materials- see patient instructions. Discussed use, benefit, and side effects of prescribed medications. All patient questions answered. Pt voiced understanding. Patient Instructions   1. Covid test results will be called to you when they are available. 2. Rest  3. Hydrate with water or gatorade  4. OTC cough/cold medications are okay -follow label instructions (tylenol cold and flu severe or equivalent off brand, if high blood pressure use coricidin HBP)  5. Tylenol or motrin for pain or fever  6. Warm salt water gargles or warm liquids for comfort. Warm tea with a tablespoon of honey is excellent for sore throat. 7. Cool mist humidifer   8. If symptoms worsen, please seek reevaluation  9. Antibiotics twice daily with food  10.  Take daily probiotic while on antibiotics and then for a week after      Electronically signed by ABHI Evangelista CNP on 1/10/2023 at 1:34 PM

## 2023-01-10 NOTE — PATIENT INSTRUCTIONS
1. Covid test results will be called to you when they are available. 2. Rest  3. Hydrate with water or gatorade  4. OTC cough/cold medications are okay -follow label instructions (tylenol cold and flu severe or equivalent off brand, if high blood pressure use coricidin HBP)  5. Tylenol or motrin for pain or fever  6. Warm salt water gargles or warm liquids for comfort. Warm tea with a tablespoon of honey is excellent for sore throat. 7. Cool mist humidifer   8. If symptoms worsen, please seek reevaluation  9. Antibiotics twice daily with food  10.  Take daily probiotic while on antibiotics and then for a week after

## 2023-01-12 ENCOUNTER — HOSPITAL ENCOUNTER (EMERGENCY)
Age: 33
Discharge: HOME | End: 2023-01-12
Attending: PEDIATRICS
Payer: COMMERCIAL

## 2023-01-12 VITALS
TEMPERATURE: 98.7 F | SYSTOLIC BLOOD PRESSURE: 103 MMHG | RESPIRATION RATE: 18 BRPM | OXYGEN SATURATION: 90 % | DIASTOLIC BLOOD PRESSURE: 64 MMHG | HEART RATE: 84 BPM

## 2023-01-12 DIAGNOSIS — B34.9 VIRAL SYNDROME: Primary | ICD-10-CM

## 2023-01-12 DIAGNOSIS — R52 BODY ACHES: ICD-10-CM

## 2023-01-12 LAB
ADENOVIRUS BY PCR: NOT DETECTED
ALBUMIN SERPL-MCNC: 3.8 G/DL (ref 3.5–5.2)
ALP BLD-CCNC: 66 U/L (ref 40–130)
ALT SERPL-CCNC: 38 U/L (ref 5–41)
ANION GAP SERPL CALCULATED.3IONS-SCNC: 13 MMOL/L (ref 7–19)
AST SERPL-CCNC: 37 U/L (ref 5–40)
BASOPHILS ABSOLUTE: 0 K/UL (ref 0–0.2)
BASOPHILS RELATIVE PERCENT: 0 % (ref 0–1)
BILIRUB SERPL-MCNC: 0.3 MG/DL (ref 0.2–1.2)
BORDETELLA PARAPERTUSSIS BY PCR: NOT DETECTED
BORDETELLA PERTUSSIS BY PCR: NOT DETECTED
BUN BLDV-MCNC: 15 MG/DL (ref 6–20)
CALCIUM SERPL-MCNC: 8.9 MG/DL (ref 8.6–10)
CHLAMYDOPHILIA PNEUMONIAE BY PCR: NOT DETECTED
CHLORIDE BLD-SCNC: 101 MMOL/L (ref 98–111)
CO2: 21 MMOL/L (ref 22–29)
CORONAVIRUS 229E BY PCR: NOT DETECTED
CORONAVIRUS HKU1 BY PCR: NOT DETECTED
CORONAVIRUS NL63 BY PCR: NOT DETECTED
CORONAVIRUS OC43 BY PCR: NOT DETECTED
CREAT SERPL-MCNC: 0.8 MG/DL (ref 0.5–1.2)
EOSINOPHILS ABSOLUTE: 0.05 K/UL (ref 0–0.6)
EOSINOPHILS RELATIVE PERCENT: 1 % (ref 0–5)
GFR SERPL CREATININE-BSD FRML MDRD: >60 ML/MIN/{1.73_M2}
GLUCOSE BLD-MCNC: 103 MG/DL (ref 74–109)
HCT VFR BLD CALC: 38.3 % (ref 42–52)
HEMOGLOBIN: 13.3 G/DL (ref 14–18)
HUMAN METAPNEUMOVIRUS BY PCR: NOT DETECTED
HUMAN RHINOVIRUS/ENTEROVIRUS BY PCR: NOT DETECTED
IMMATURE GRANULOCYTES #: 0 K/UL
INFLUENZA A BY PCR: NOT DETECTED
INFLUENZA B BY PCR: NOT DETECTED
LYMPHOCYTES ABSOLUTE: 1.8 K/UL (ref 1.1–4.5)
LYMPHOCYTES RELATIVE PERCENT: 34 % (ref 20–40)
MCH RBC QN AUTO: 31.9 PG (ref 27–31)
MCHC RBC AUTO-ENTMCNC: 34.7 G/DL (ref 33–37)
MCV RBC AUTO: 91.8 FL (ref 80–94)
MONOCYTES ABSOLUTE: 0.2 K/UL (ref 0–0.9)
MONOCYTES RELATIVE PERCENT: 3 % (ref 0–10)
MYCOPLASMA PNEUMONIAE BY PCR: NOT DETECTED
NEUTROPHILS ABSOLUTE: 3.2 K/UL (ref 1.5–7.5)
NEUTROPHILS RELATIVE PERCENT: 62 % (ref 50–65)
PARAINFLUENZA VIRUS 1 BY PCR: NOT DETECTED
PARAINFLUENZA VIRUS 2 BY PCR: NOT DETECTED
PARAINFLUENZA VIRUS 3 BY PCR: NOT DETECTED
PARAINFLUENZA VIRUS 4 BY PCR: NOT DETECTED
PDW BLD-RTO: 12.7 % (ref 11.5–14.5)
PLATELET # BLD: 267 K/UL (ref 130–400)
PLATELET SLIDE REVIEW: ADEQUATE
PMV BLD AUTO: 8.4 FL (ref 9.4–12.4)
POTASSIUM SERPL-SCNC: 4.2 MMOL/L (ref 3.5–5)
RBC # BLD: 4.17 M/UL (ref 4.7–6.1)
RESPIRATORY SYNCYTIAL VIRUS BY PCR: NOT DETECTED
SARS-COV-2, PCR: NOT DETECTED
SODIUM BLD-SCNC: 135 MMOL/L (ref 136–145)
TOTAL PROTEIN: 7.1 G/DL (ref 6.6–8.7)
WBC # BLD: 5.2 K/UL (ref 4.8–10.8)

## 2023-01-12 PROCEDURE — 80053 COMPREHEN METABOLIC PANEL: CPT

## 2023-01-12 PROCEDURE — 99284 EMERGENCY DEPT VISIT MOD MDM: CPT

## 2023-01-12 PROCEDURE — 6360000002 HC RX W HCPCS: Performed by: PEDIATRICS

## 2023-01-12 PROCEDURE — 36415 COLL VENOUS BLD VENIPUNCTURE: CPT

## 2023-01-12 PROCEDURE — 2580000003 HC RX 258: Performed by: PEDIATRICS

## 2023-01-12 PROCEDURE — 96375 TX/PRO/DX INJ NEW DRUG ADDON: CPT

## 2023-01-12 PROCEDURE — 96374 THER/PROPH/DIAG INJ IV PUSH: CPT

## 2023-01-12 PROCEDURE — 0202U NFCT DS 22 TRGT SARS-COV-2: CPT

## 2023-01-12 PROCEDURE — 85025 COMPLETE CBC W/AUTO DIFF WBC: CPT

## 2023-01-12 RX ORDER — IBUPROFEN 600 MG/1
600 TABLET ORAL EVERY 6 HOURS PRN
Qty: 30 TABLET | Refills: 0 | Status: SHIPPED | OUTPATIENT
Start: 2023-01-12

## 2023-01-12 RX ORDER — ORPHENADRINE CITRATE 30 MG/ML
60 INJECTION INTRAMUSCULAR; INTRAVENOUS ONCE
Status: DISCONTINUED | OUTPATIENT
Start: 2023-01-12 | End: 2023-01-12

## 2023-01-12 RX ORDER — CYCLOBENZAPRINE HCL 5 MG
5 TABLET ORAL 3 TIMES DAILY PRN
Qty: 15 TABLET | Refills: 0 | Status: SHIPPED | OUTPATIENT
Start: 2023-01-12

## 2023-01-12 RX ORDER — MORPHINE SULFATE 2 MG/ML
2 INJECTION, SOLUTION INTRAMUSCULAR; INTRAVENOUS ONCE
Status: COMPLETED | OUTPATIENT
Start: 2023-01-12 | End: 2023-01-12

## 2023-01-12 RX ORDER — 0.9 % SODIUM CHLORIDE 0.9 %
1000 INTRAVENOUS SOLUTION INTRAVENOUS ONCE
Status: COMPLETED | OUTPATIENT
Start: 2023-01-12 | End: 2023-01-12

## 2023-01-12 RX ORDER — KETOROLAC TROMETHAMINE 30 MG/ML
15 INJECTION, SOLUTION INTRAMUSCULAR; INTRAVENOUS ONCE
Status: COMPLETED | OUTPATIENT
Start: 2023-01-12 | End: 2023-01-12

## 2023-01-12 RX ORDER — ORPHENADRINE CITRATE 30 MG/ML
60 INJECTION INTRAMUSCULAR; INTRAVENOUS ONCE
Status: COMPLETED | OUTPATIENT
Start: 2023-01-12 | End: 2023-01-12

## 2023-01-12 RX ADMIN — ORPHENADRINE CITRATE 60 MG: 30 INJECTION INTRAMUSCULAR; INTRAVENOUS at 05:58

## 2023-01-12 RX ADMIN — KETOROLAC TROMETHAMINE 15 MG: 30 INJECTION, SOLUTION INTRAMUSCULAR; INTRAVENOUS at 05:52

## 2023-01-12 RX ADMIN — SODIUM CHLORIDE 1000 ML: 9 INJECTION, SOLUTION INTRAVENOUS at 05:50

## 2023-01-12 RX ADMIN — MORPHINE SULFATE 2 MG: 2 INJECTION, SOLUTION INTRAMUSCULAR; INTRAVENOUS at 05:50

## 2023-01-12 ASSESSMENT — PAIN SCALES - GENERAL: PAINLEVEL_OUTOF10: 10

## 2023-01-12 ASSESSMENT — PAIN DESCRIPTION - LOCATION: LOCATION: HEAD;NECK

## 2023-01-12 NOTE — DISCHARGE INSTRUCTIONS
Return or seek medical attention with increasing or severe pain, signs of dehydration such as dry mouth or diminished urine output, difficulty breathing, or other concerns. Drink at least 60 to 80 ounces of fluids daily.

## 2023-01-12 NOTE — Clinical Note
Zita Peters was seen and treated in our emergency department on 1/12/2023. He may return to work on 01/15/2023. If you have any questions or concerns, please don't hesitate to call.       Haydee Henson MD

## 2023-01-12 NOTE — Clinical Note
Suzanne Humphrey was seen and treated in our emergency department on 1/12/2023. He may return to work on 01/15/2023. If you have any questions or concerns, please don't hesitate to call.       Treva Aiken MD

## 2023-01-19 ASSESSMENT — ENCOUNTER SYMPTOMS
VOMITING: 0
COUGH: 1
ABDOMINAL PAIN: 0
BACK PAIN: 1
RHINORRHEA: 0
NAUSEA: 1
SHORTNESS OF BREATH: 0
DIARRHEA: 0
SORE THROAT: 1

## 2023-01-19 NOTE — ED PROVIDER NOTES
[facility] [place] [facility]  eMERGENCY dEPARTMENT eNCOUnter      Pt Name: Yuliya Deleon  MRN: [id number]  [place] 1990  Date of evaluation: 1/12/2023  Provider: Nusrat Mittal MD    [address] [place] [address]       Chief Complaint   Patient presents with    Generalized Body Aches     Pt c/o head and neck pain since last Thursday. Pt states he also has chills. Pt stated \"gets worse at night\"         HISTORY OF PRESENT ILLNESS   (Location/Symptom, Timing/Onset,Context/Setting, Quality, Duration, Modifying Factors, Severity)  Note limiting factors. Yuliya Deleon is a 34 y.o. male who presents to the emergency department with generalized body aches. Patient states symptoms began [facility] Thursday. \"  Other symptoms include \"cold chills\", fever, right earache, headache, neck pain, cough, congestion, nausea, sore throat, and \"numbness in the back of my head. \"  Patient was seen by ABHI Sargent yesterday for same symptoms. Patient was started on amoxicillin/clavulanic acid and a probiotic. Patient states that he had an ear infection and a virus. Patient has been taking ibuprofen, Tylenol sinus, DayQuil and Benadryl. Patient states that he tested negative for COVID-19. Patient states that he has been drinking well and has good urine output. HPI    NursingNotes were reviewed. REVIEW OF SYSTEMS    (2-9 systems for level 4, 10 or more for level 5)     Review of Systems   Constitutional:  Positive for activity change, appetite change, chills, fatigue and fever. HENT:  Positive for congestion, ear pain and sore throat. Negative for rhinorrhea. Respiratory:  Positive for cough. Negative for shortness of breath. Cardiovascular:  Negative for chest pain and palpitations. Gastrointestinal:  Positive for nausea. Negative for abdominal pain, diarrhea and vomiting. Genitourinary:  Negative for difficulty urinating and dysuria. Musculoskeletal:  Positive for back pain, myalgias and neck pain.  Negative for neck stiffness. Skin:  Negative for rash. Neurological:  Positive for numbness (postior scalp) and headaches. Negative for syncope and speech difficulty. Psychiatric/Behavioral:  Negative for confusion and suicidal ideas. PAST MEDICALHISTORY   History reviewed. No pertinent past medical history. SURGICAL HISTORY       Past Surgical History:   Procedure Laterality Date    COLONOSCOPY N/A 09/20/2022    Dr Chico Stewart, (-)micro colitis, int hem Gr1, 13 year recall    TEAR DUCT SURGERY      TEAR DUCT SURGERY      UPPER GASTROINTESTINAL ENDOSCOPY N/A 09/20/2022    Dr Ken Swanson gastritis, (-)Sprue, (-)Hpylori,         CURRENT MEDICATIONS     Discharge Medication List as of 1/12/2023  7:38 AM        CONTINUE these medications which have NOT CHANGED    Details   amoxicillin-clavulanate (AUGMENTIN) 875-125 MG per tablet Take 1 tablet by mouth 2 times daily for 10 days, Disp-20 tablet, R-0Normal             ALLERGIES     Patient has no known allergies.     FAMILY HISTORY       Family History   Problem Relation Age of Onset    Colon Cancer Neg Hx     Colon Polyps Neg Hx           SOCIAL HISTORY       Social History     Socioeconomic History    Marital status: Single     Spouse name: None    Number of children: None    Years of education: None    Highest education level: None   Tobacco Use    Smoking status: Every Day     Types: Cigars    Smokeless tobacco: Never    Tobacco comments:     ISIDRO MULLER MILDS   Vaping Use    Vaping Use: Never used   Substance and Sexual Activity    Alcohol use: Yes     Comment: occ    Drug use: Not Currently     Types: Marijuana Feli Beat)     Comment: STOPPED IN JULY 2022    Sexual activity: Not Currently       SCREENINGS    Otto Coma Scale  Eye Opening: Spontaneous  Best Verbal Response: Oriented  Best Motor Response: Obeys commands  Otto Coma Scale Score: 15        PHYSICAL EXAM    (up to 7 for level 4, 8 or more for level 5)     ED Triage Vitals [01/12/23 0300] BP Temp Temp Source Heart Rate Resp SpO2 Height Weight   135/72 99.3 °F (37.4 °C) Oral 90 18 94 % -- --       Physical Exam  Vitals and nursing note reviewed. Constitutional:       General: He is not in acute distress. Comments: Appears uncomfortable but in no acute distress. HENT:      Head: Normocephalic and atraumatic. Right Ear: External ear normal.      Left Ear: External ear normal.      Ears:      Comments: Bilateral TMs with mild surrounding erythema but with improving light reflex. Nose: Congestion present. No rhinorrhea. Mouth/Throat:      Mouth: Mucous membranes are moist.      Pharynx: Oropharynx is clear. No oropharyngeal exudate or posterior oropharyngeal erythema. Eyes:      General: No scleral icterus. Conjunctiva/sclera: Conjunctivae normal.      Pupils: Pupils are equal, round, and reactive to light. Neck:      Comments: Spasm of bilateral sternocleidomastoid muscles and trapezius muscles of neck. No nuchal rigidity. Patient turns head easily. Strap muscles of mid and lower back also with spasm. Cardiovascular:      Rate and Rhythm: Normal rate and regular rhythm. Pulses: Normal pulses. Heart sounds: Normal heart sounds. No murmur heard. Pulmonary:      Effort: Pulmonary effort is normal. No respiratory distress. Breath sounds: Normal breath sounds. No stridor. No wheezing, rhonchi or rales. Chest:      Chest wall: No tenderness. Abdominal:      General: Bowel sounds are normal. There is no distension. Palpations: Abdomen is soft. Tenderness: There is no abdominal tenderness. There is no guarding or rebound. Musculoskeletal:         General: Tenderness (Diffuse muscle tenderness and spasm.) present. No deformity. Cervical back: Neck supple. No rigidity or tenderness. Skin:     General: Skin is warm and dry. Capillary Refill: Capillary refill takes less than 2 seconds. Coloration: Skin is not jaundiced. Findings: No erythema. Neurological:      General: No focal deficit present. Mental Status: He is alert and oriented to person, place, and time. Mental status is at baseline. Cranial Nerves: No cranial nerve deficit. Sensory: No sensory deficit. Motor: No weakness. Coordination: Coordination normal.   Psychiatric:         Mood and Affect: Mood normal.         Behavior: Behavior normal.       DIAGNOSTIC RESULTS     RADIOLOGY:  Non-plain film images such as CT, Ultrasound and MRI are read by the radiologist. Plain radiographic images are visualized and preliminarily interpreted bythe emergency physician with the below findings:          No orders to display           LABS:  Labs Reviewed   CBC WITH AUTO DIFFERENTIAL - Abnormal; Notable for the following components:       Result Value    RBC 4.17 (*)     Hemoglobin 13.3 (*)     Hematocrit 38.3 (*)     MCH 31.9 (*)     MPV 8.4 (*)     All other components within normal limits   COMPREHENSIVE METABOLIC PANEL - Abnormal; Notable for the following components:    Sodium 135 (*)     CO2 21 (*)     All other components within normal limits   RESPIRATORY PANEL, MOLECULAR, WITH COVID-19       All other labs were within normal range or not returned as of this dictation. EMERGENCY DEPARTMENT COURSE and DIFFERENTIAL DIAGNOSIS/MDM:   Vitals:    Vitals:    01/12/23 0300 01/12/23 0700 01/12/23 0730   BP: 135/72 102/65 103/64   Pulse: 90 89 84   Resp: 18 18 18   Temp: 99.3 °F (37.4 °C) 98.7 °F (37.1 °C)    TempSrc: Oral     SpO2: 94% 92% 90%       MDM     Amount and/or Complexity of Data Reviewed  Clinical lab tests: reviewed    39-year-old male presents to the emergency department with multiple symptoms. Patient was seen yesterday by PCP for same. Patient is drinking well with good urine output. Patient's lungs are clear and he has no shortness of breath. Patient has been experiencing muscle pain and spasm.   Patient will be placed on ibuprofen and Flexeril. Patient will finish his Augmentin. Patient will return or seek medical attention with increasing or severe pain, neck rigidity, confusion, lethargy, difficulty breathing, signs of dehydration, or other concerns. CONSULTS:  None    PROCEDURES:  Unless otherwise noted below, none     Procedures    FINAL IMPRESSION      1. Viral syndrome    2. Body aches          DISPOSITION/PLAN   DISPOSITION Decision To Discharge 01/12/2023 07:34:05 AM      PATIENT REFERRED TO:  Deanne Lionel [place] [id number] [phone]  Schedule an appointment as soon as possible for a visit         DISCHARGE MEDICATIONS:  Discharge Medication List as of 1/12/2023  7:38 AM        START taking these medications    Details   ibuprofen (ADVIL;MOTRIN) 600 MG tablet Take 1 tablet by mouth every 6 hours as needed for Pain or Fever Take with food. , Disp-30 tablet, R-0Normal      cyclobenzaprine (FLEXERIL) 5 MG tablet Take 1 tablet by mouth 3 times daily as needed for Muscle spasms, Disp-15 tablet, R-0Normal                (Please note that portions of this note were completed with a voice recognition program.  Efforts were made to edit thedictations but occasionally words are mis-transcribed.)    Curly Tanner MD (electronically signed)  Attending Emergency Physician          Curly Tanner MD  01/19/23 1574

## 2023-11-11 ENCOUNTER — OFFICE VISIT (OUTPATIENT)
Age: 33
End: 2023-11-11
Payer: MEDICAID

## 2023-11-11 VITALS
OXYGEN SATURATION: 99 % | RESPIRATION RATE: 16 BRPM | BODY MASS INDEX: 21.01 KG/M2 | DIASTOLIC BLOOD PRESSURE: 66 MMHG | SYSTOLIC BLOOD PRESSURE: 112 MMHG | HEART RATE: 86 BPM | TEMPERATURE: 97.4 F | WEIGHT: 130.2 LBS

## 2023-11-11 DIAGNOSIS — R21 RASH: Primary | ICD-10-CM

## 2023-11-11 PROCEDURE — 99213 OFFICE O/P EST LOW 20 MIN: CPT | Performed by: NURSE PRACTITIONER

## 2023-11-11 PROCEDURE — 96372 THER/PROPH/DIAG INJ SC/IM: CPT | Performed by: NURSE PRACTITIONER

## 2023-11-11 RX ORDER — DEXAMETHASONE SODIUM PHOSPHATE 10 MG/ML
8 INJECTION INTRAMUSCULAR; INTRAVENOUS ONCE
Status: COMPLETED | OUTPATIENT
Start: 2023-11-11 | End: 2023-11-11

## 2023-11-11 RX ORDER — PERMETHRIN 50 MG/G
CREAM TOPICAL
Qty: 60 G | Refills: 0 | Status: SHIPPED | OUTPATIENT
Start: 2023-11-11

## 2023-11-11 RX ADMIN — DEXAMETHASONE SODIUM PHOSPHATE 8 MG: 10 INJECTION INTRAMUSCULAR; INTRAVENOUS at 17:02

## 2023-11-11 ASSESSMENT — ENCOUNTER SYMPTOMS
GASTROINTESTINAL NEGATIVE: 1
ALLERGIC/IMMUNOLOGIC NEGATIVE: 1
EYES NEGATIVE: 1
RESPIRATORY NEGATIVE: 1

## 2023-11-11 NOTE — PROGRESS NOTES
Medication was administered by Janes Whalen LPN at 6:39 PM.    Medication: dexamethasone  Amount: 8mg  Route: intramuscular  Site: Dorsogluteal right    Patient tolerated well.

## 2023-11-11 NOTE — PATIENT INSTRUCTIONS
Do not scratch rash. Keep rash clean and dry. Take a luke warm or cool shower. May take a lukewarm bath with oatmeal.    Use a mild soap such as Dove. May take Benadryl for itching every 6-8 hours. Be careful of driving as it may make you sleepy. May start Zyrtec daily. May start Pepcid twice daily.

## 2023-11-11 NOTE — PROGRESS NOTES
Dena Arnaa (:  1990) is a 34 y.o. male,Established patient, here for evaluation of the following chief complaint(s):  Rash (Hands/arms)    Patient presents today due to a itchy rash that is present throughout body. This began within the past week. Denies any recent changes in products other than laundry detergent which he changes 1 month ago. Denies any other symptoms at this time such as fever, body aches, chills, GI symptoms. He does live with a girlfriend which does not have a rash. He has checked his mattress for bedbugs which he states he did not find. Discussed the rash looks similar to a scabies rash. Patient would like to be treated to cover for scabies. Discussed with patient the need to wash bedding and clothing in hot water. Steroid injection given in office. Discussed with patient he may start Zyrtec, Benadryl, and Pepcid. Care instructions discussed. Patient verbalized understanding and agrees to plan of care. ASSESSMENT/PLAN:  1. Rash  -     dexamethasone (DECADRON) injection 8 mg; 8 mg, IntraMUSCular, ONCE, 1 dose, On Sat 23 at 1730     Orders Placed This Encounter   Medications    dexamethasone (DECADRON) injection 8 mg    permethrin (ELIMITE) 5 % cream     Sig: Apply topically from neck down. Leave on overnight for 10 hours. Dispense:  60 g     Refill:  0        Return if symptoms worsen or fail to improve. Subjective   SUBJECTIVE/OBJECTIVE:  HPI    Review of Systems   Constitutional: Negative. HENT: Negative. Eyes: Negative. Respiratory: Negative. Cardiovascular: Negative. Gastrointestinal: Negative. Endocrine: Negative. Genitourinary: Negative. Musculoskeletal: Negative. Skin:  Positive for rash. Allergic/Immunologic: Negative. Neurological: Negative. Hematological: Negative. Psychiatric/Behavioral: Negative. Objective   Physical Exam  Vitals reviewed.    Skin:     General: Skin is warm

## 2023-12-05 ENCOUNTER — OFFICE VISIT (OUTPATIENT)
Age: 33
End: 2023-12-05
Payer: MEDICAID

## 2023-12-05 VITALS
OXYGEN SATURATION: 97 % | SYSTOLIC BLOOD PRESSURE: 108 MMHG | WEIGHT: 133 LBS | HEART RATE: 83 BPM | TEMPERATURE: 97.8 F | BODY MASS INDEX: 21.47 KG/M2 | DIASTOLIC BLOOD PRESSURE: 62 MMHG | RESPIRATION RATE: 20 BRPM

## 2023-12-05 DIAGNOSIS — H10.31 ACUTE BACTERIAL CONJUNCTIVITIS OF RIGHT EYE: Primary | ICD-10-CM

## 2023-12-05 DIAGNOSIS — R21 RASH: ICD-10-CM

## 2023-12-05 PROCEDURE — 99213 OFFICE O/P EST LOW 20 MIN: CPT

## 2023-12-05 RX ORDER — TOBRAMYCIN 3 MG/ML
1 SOLUTION/ DROPS OPHTHALMIC EVERY 4 HOURS
Qty: 1 EACH | Refills: 0 | Status: SHIPPED | OUTPATIENT
Start: 2023-12-05 | End: 2023-12-15

## 2023-12-05 ASSESSMENT — ENCOUNTER SYMPTOMS
SINUS PRESSURE: 0
ABDOMINAL PAIN: 0
COUGH: 0
NAUSEA: 0
EYE REDNESS: 1
SORE THROAT: 0
COLOR CHANGE: 0
CONSTIPATION: 0
EYE DISCHARGE: 1
WHEEZING: 0
RHINORRHEA: 0
SHORTNESS OF BREATH: 0
DIARRHEA: 0
EYE PAIN: 0
EYE ITCHING: 1
BLOOD IN STOOL: 0
VOMITING: 0

## 2023-12-05 NOTE — PROGRESS NOTES
Wash hands frequently and avoid touching the eyes  - If applicable, discontinue eye makeup to support healing.  - If applicable, discontinue use of contacts until treatment is complete and wear glasses only. - The patient is to follow up with PCP or return to clinic if symptoms worsen/fail to improve. No orders of the defined types were placed in this encounter. No results found for this visit on 12/05/23. Orders Placed This Encounter   Medications    tobramycin (TOBREX) 0.3 % ophthalmic solution     Sig: Place 1 drop into the left eye every 4 hours for 10 days     Dispense:  1 each     Refill:  0      New Prescriptions    TOBRAMYCIN (TOBREX) 0.3 % OPHTHALMIC SOLUTION    Place 1 drop into the left eye every 4 hours for 10 days        No follow-ups on file. Discussed use, benefits, and side effects of any prescribed medications. All patient questions were answered. Patient voiced understanding of care plan. Patient was given educational materials - see patient instructions below. Patient Instructions   - Use Tobrex as prescribed  - Recommended warm, moist compresses three to five times per day   - Wash hands frequently and avoid touching the eyes  - If applicable, discontinue eye makeup to support healing.  - If applicable, discontinue use of contacts until treatment is complete and wear glasses only. - The patient is to follow up with PCP or return to clinic if symptoms worsen/fail to improve.        Electronically signed by ABHI Johnson NP on 12/5/2023 at 2:53 PM

## 2023-12-05 NOTE — PATIENT INSTRUCTIONS
- Use Tobrex as prescribed  - Recommended warm, moist compresses three to five times per day   - Wash hands frequently and avoid touching the eyes  - If applicable, discontinue eye makeup to support healing.  - If applicable, discontinue use of contacts until treatment is complete and wear glasses only. - The patient is to follow up with PCP or return to clinic if symptoms worsen/fail to improve.

## 2024-11-19 ENCOUNTER — HOSPITAL ENCOUNTER (EMERGENCY)
Age: 34
Discharge: HOME | End: 2024-11-19
Payer: MEDICAID

## 2024-11-19 VITALS
TEMPERATURE: 97.8 F | HEIGHT: 68 IN | WEIGHT: 143 LBS | BODY MASS INDEX: 21.67 KG/M2 | HEART RATE: 107 BPM | SYSTOLIC BLOOD PRESSURE: 119 MMHG | RESPIRATION RATE: 18 BRPM | DIASTOLIC BLOOD PRESSURE: 69 MMHG | OXYGEN SATURATION: 95 %

## 2024-11-19 DIAGNOSIS — J06.9 VIRAL URI WITH COUGH: Primary | ICD-10-CM

## 2024-11-19 LAB
FLUAV AG NPH QL: NEGATIVE
FLUBV AG NPH QL: NEGATIVE
S PYO AG THROAT QL: NEGATIVE
SARS-COV-2 RDRP RESP QL NAA+PROBE: NOT DETECTED

## 2024-11-19 PROCEDURE — 87804 INFLUENZA ASSAY W/OPTIC: CPT

## 2024-11-19 PROCEDURE — 87081 CULTURE SCREEN ONLY: CPT

## 2024-11-19 PROCEDURE — 87635 SARS-COV-2 COVID-19 AMP PRB: CPT

## 2024-11-19 PROCEDURE — 87880 STREP A ASSAY W/OPTIC: CPT

## 2024-11-19 PROCEDURE — 99283 EMERGENCY DEPT VISIT LOW MDM: CPT

## 2024-11-19 RX ORDER — PREDNISONE 10 MG/1
10 TABLET ORAL 2 TIMES DAILY
Qty: 10 TABLET | Refills: 0 | Status: SHIPPED | OUTPATIENT
Start: 2024-11-19 | End: 2024-11-24

## 2024-11-19 RX ORDER — GUAIFENESIN 600 MG/1
600 TABLET, EXTENDED RELEASE ORAL 2 TIMES DAILY
Qty: 30 TABLET | Refills: 0 | Status: SHIPPED | OUTPATIENT
Start: 2024-11-19 | End: 2024-12-04

## 2024-11-19 ASSESSMENT — PAIN - FUNCTIONAL ASSESSMENT: PAIN_FUNCTIONAL_ASSESSMENT: 0-10

## 2024-11-19 ASSESSMENT — PAIN SCALES - GENERAL: PAINLEVEL_OUTOF10: 8

## 2024-11-19 ASSESSMENT — ENCOUNTER SYMPTOMS
SORE THROAT: 1
COUGH: 1
SINUS CONGESTION: 1

## 2024-11-20 NOTE — ED PROVIDER NOTES
more for level 5)     ED Triage Vitals   BP Systolic BP Percentile Diastolic BP Percentile Temp Temp src Pulse Respirations SpO2   11/19/24 2000 -- -- 11/19/24 1956 -- 11/19/24 1956 11/19/24 1956 11/19/24 1956   119/69   97.8 °F (36.6 °C)  (!) 107 18 95 %      Height Weight - Scale         11/19/24 1956 11/19/24 1956         1.727 m (5' 8\") 64.9 kg (143 lb)             Physical Exam  Vitals and nursing note reviewed.   Constitutional:       Appearance: Normal appearance. He is well-developed.   HENT:      Head: Normocephalic and atraumatic.      Right Ear: Tympanic membrane normal.      Left Ear: Tympanic membrane normal.      Mouth/Throat:      Mouth: Mucous membranes are moist.      Pharynx: Uvula midline. Posterior oropharyngeal erythema present.   Eyes:      General: No scleral icterus.        Right eye: No discharge.         Left eye: No discharge.   Cardiovascular:      Rate and Rhythm: Regular rhythm. Tachycardia present.      Heart sounds: Normal heart sounds, S1 normal and S2 normal.   Pulmonary:      Effort: No respiratory distress.      Breath sounds: Normal breath sounds.   Musculoskeletal:      Cervical back: Normal range of motion and neck supple.   Neurological:      Mental Status: He is alert and oriented to person, place, and time.   Psychiatric:         Behavior: Behavior normal.         RESULTS     EKG: All EKG's are interpreted by the Emergency Department Physician who either signs or Co-signsthis chart in the absence of a cardiologist.        RADIOLOGY:   Non-plain filmimages such as CT, Ultrasound and MRI are read by the radiologist. Plain radiographic images are visualized and preliminarily interpreted by the emergency physician with the below findings:      Interpretation per the Radiologist below, if available at the time of this note:    No orders to display         ED BEDSIDEULTRASOUND:   Performed by ED Physician -none    LABS:  Labs Reviewed   COVID-19, RAPID   RAPID INFLUENZA A/B

## 2024-11-21 LAB — S PYO THROAT QL CULT: NORMAL

## 2025-06-16 ENCOUNTER — APPOINTMENT (OUTPATIENT)
Dept: GENERAL RADIOLOGY | Age: 35
End: 2025-06-16

## 2025-06-16 ENCOUNTER — HOSPITAL ENCOUNTER (EMERGENCY)
Age: 35
Discharge: HOME | End: 2025-06-16

## 2025-06-16 VITALS
TEMPERATURE: 98.7 F | HEIGHT: 68 IN | HEART RATE: 87 BPM | OXYGEN SATURATION: 95 % | DIASTOLIC BLOOD PRESSURE: 80 MMHG | RESPIRATION RATE: 20 BRPM | WEIGHT: 143 LBS | SYSTOLIC BLOOD PRESSURE: 113 MMHG | BODY MASS INDEX: 21.67 KG/M2

## 2025-06-16 DIAGNOSIS — J06.9 VIRAL URI WITH COUGH: Primary | ICD-10-CM

## 2025-06-16 LAB
B PARAP IS1001 DNA NPH QL NAA+NON-PROBE: NOT DETECTED
B PERT.PT PRMT NPH QL NAA+NON-PROBE: NOT DETECTED
C PNEUM DNA NPH QL NAA+NON-PROBE: NOT DETECTED
FLUAV RNA NPH QL NAA+NON-PROBE: NOT DETECTED
FLUBV RNA NPH QL NAA+NON-PROBE: NOT DETECTED
HADV DNA NPH QL NAA+NON-PROBE: NOT DETECTED
HCOV 229E RNA NPH QL NAA+NON-PROBE: NOT DETECTED
HCOV HKU1 RNA NPH QL NAA+NON-PROBE: NOT DETECTED
HCOV NL63 RNA NPH QL NAA+NON-PROBE: NOT DETECTED
HCOV OC43 RNA NPH QL NAA+NON-PROBE: NOT DETECTED
HMPV RNA NPH QL NAA+NON-PROBE: NOT DETECTED
HPIV1 RNA NPH QL NAA+NON-PROBE: NOT DETECTED
HPIV2 RNA NPH QL NAA+NON-PROBE: NOT DETECTED
HPIV3 RNA NPH QL NAA+NON-PROBE: NOT DETECTED
HPIV4 RNA NPH QL NAA+NON-PROBE: NOT DETECTED
M PNEUMO DNA NPH QL NAA+NON-PROBE: NOT DETECTED
RSV RNA NPH QL NAA+NON-PROBE: NOT DETECTED
RV+EV RNA NPH QL NAA+NON-PROBE: NOT DETECTED
SARS-COV-2 RNA NPH QL NAA+NON-PROBE: NOT DETECTED

## 2025-06-16 PROCEDURE — 71045 X-RAY EXAM CHEST 1 VIEW: CPT

## 2025-06-16 PROCEDURE — 94640 AIRWAY INHALATION TREATMENT: CPT

## 2025-06-16 PROCEDURE — 0202U NFCT DS 22 TRGT SARS-COV-2: CPT

## 2025-06-16 PROCEDURE — 6370000000 HC RX 637 (ALT 250 FOR IP): Performed by: PHYSICIAN ASSISTANT

## 2025-06-16 PROCEDURE — 99284 EMERGENCY DEPT VISIT MOD MDM: CPT

## 2025-06-16 RX ORDER — PREDNISONE 5 MG/1
5 TABLET ORAL ONCE
Status: COMPLETED | OUTPATIENT
Start: 2025-06-16 | End: 2025-06-16

## 2025-06-16 RX ORDER — IPRATROPIUM BROMIDE AND ALBUTEROL SULFATE 2.5; .5 MG/3ML; MG/3ML
1 SOLUTION RESPIRATORY (INHALATION) ONCE
Status: COMPLETED | OUTPATIENT
Start: 2025-06-16 | End: 2025-06-16

## 2025-06-16 RX ORDER — PREDNISONE 10 MG/1
10 TABLET ORAL DAILY
Qty: 10 TABLET | Refills: 0 | Status: SHIPPED | OUTPATIENT
Start: 2025-06-16 | End: 2025-06-26

## 2025-06-16 RX ADMIN — IPRATROPIUM BROMIDE AND ALBUTEROL SULFATE 1 DOSE: 2.5; .5 SOLUTION RESPIRATORY (INHALATION) at 10:41

## 2025-06-16 RX ADMIN — PREDNISONE 5 MG: 5 TABLET ORAL at 11:10

## 2025-06-16 ASSESSMENT — ENCOUNTER SYMPTOMS
COLOR CHANGE: 0
EYE ITCHING: 0
SHORTNESS OF BREATH: 0
APNEA: 0
EYE DISCHARGE: 0
PHOTOPHOBIA: 0
COUGH: 1
BACK PAIN: 0

## 2025-06-16 ASSESSMENT — PAIN - FUNCTIONAL ASSESSMENT
PAIN_FUNCTIONAL_ASSESSMENT: NONE - DENIES PAIN
PAIN_FUNCTIONAL_ASSESSMENT: NONE - DENIES PAIN

## 2025-06-16 NOTE — ED PROVIDER NOTES
[facility] EMERGENCY DEPARTMENT  eMERGENCYdEPARTMENT eNCOUnter      Pt Name: Justin John  MRN: [id number]  Birthdate 1990  Date of evaluation: 6/16/2025  Provider:JONNY Chopra    CHIEF COMPLAINT       Chief Complaint   Patient presents with    Cough     C/o cough, chest congestion, fatigue, sore throat; onset yesterday         HISTORY OF PRESENT ILLNESS  (Location/Symptom, Timing/Onset, Context/Setting, Quality, Duration, Modifying Factors, Severity.)   Justin John is a 34 y.o. male who presents to the emergency department with congestion cough sore throat body aches started yesterday.  Patient states he was at the hospital visiting family member yesterday for Father's Day in regards to possible exposure.    HPI    Nursing Notes were reviewed and I agree.    REVIEW OF SYSTEMS    (2-9 systems for level 4, 10 or more for level 5)     Review of Systems   Constitutional:  Negative for activity change, appetite change, chills and fever.   HENT:  Positive for congestion. Negative for dental problem.    Eyes:  Negative for photophobia, discharge and itching.   Respiratory:  Positive for cough. Negative for apnea and shortness of breath.    Cardiovascular:  Negative for chest pain.   Musculoskeletal:  Positive for myalgias. Negative for arthralgias, back pain, gait problem and neck pain.   Skin:  Negative for color change, pallor and rash.   Neurological:  Negative for dizziness, seizures and syncope.   Psychiatric/Behavioral:  Negative for agitation. The patient is not nervous/anxious.         Except as noted above the remainder of the review of systems was reviewed and negative.       PAST MEDICAL HISTORY   History reviewed. No pertinent past medical history.      SURGICAL HISTORY       Past Surgical History:   Procedure Laterality Date    COLONOSCOPY N/A 09/20/2022    Dr Whitley, (-)micro colitis, int hem Gr1, 13 year recall    TEAR DUCT SURGERY      TEAR DUCT SURGERY      UPPER

## 2025-07-11 ENCOUNTER — APPOINTMENT (OUTPATIENT)
Dept: GENERAL RADIOLOGY | Age: 35
End: 2025-07-11
Payer: OTHER MISCELLANEOUS

## 2025-07-11 ENCOUNTER — HOSPITAL ENCOUNTER (EMERGENCY)
Age: 35
Discharge: HOME | End: 2025-07-11
Payer: OTHER MISCELLANEOUS

## 2025-07-11 VITALS
OXYGEN SATURATION: 98 % | SYSTOLIC BLOOD PRESSURE: 129 MMHG | BODY MASS INDEX: 21.22 KG/M2 | TEMPERATURE: 98.1 F | RESPIRATION RATE: 16 BRPM | HEART RATE: 88 BPM | WEIGHT: 140 LBS | HEIGHT: 68 IN | DIASTOLIC BLOOD PRESSURE: 93 MMHG

## 2025-07-11 DIAGNOSIS — S16.1XXA STRAIN OF NECK MUSCLE, INITIAL ENCOUNTER: Primary | ICD-10-CM

## 2025-07-11 PROCEDURE — 71101 X-RAY EXAM UNILAT RIBS/CHEST: CPT

## 2025-07-11 PROCEDURE — 99283 EMERGENCY DEPT VISIT LOW MDM: CPT

## 2025-07-11 PROCEDURE — 72040 X-RAY EXAM NECK SPINE 2-3 VW: CPT

## 2025-07-11 PROCEDURE — 72050 X-RAY EXAM NECK SPINE 4/5VWS: CPT

## 2025-07-11 ASSESSMENT — ENCOUNTER SYMPTOMS
COUGH: 0
SHORTNESS OF BREATH: 0
VOMITING: 0
BACK PAIN: 0
CHEST TIGHTNESS: 0
NAUSEA: 0
ABDOMINAL PAIN: 0
DIARRHEA: 0
PHOTOPHOBIA: 0
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
## (undated) DEVICE — BITE BLOCK ENDOSCP AD 60 FR W/ ADJ STRP PLAS GRN BLOX

## (undated) DEVICE — FORCEPS BX 240CM 2.4MM L NDL RAD JAW 4 M00513334

## (undated) DEVICE — SINGLE PORT MANIFOLD: Brand: NEPTUNE 2

## (undated) DEVICE — ENDO KIT,LOURDES HOSPITAL: Brand: MEDLINE INDUSTRIES, INC.

## (undated) DEVICE — CANNULA NSL AD L7FT DIV O2 CO2 W/ M LUERLOCK TRMPT CONN

## (undated) DEVICE — ENDO KIT: Brand: MEDLINE INDUSTRIES, INC.